# Patient Record
Sex: FEMALE | Race: WHITE | Employment: FULL TIME | URBAN - METROPOLITAN AREA
[De-identification: names, ages, dates, MRNs, and addresses within clinical notes are randomized per-mention and may not be internally consistent; named-entity substitution may affect disease eponyms.]

---

## 2017-01-30 RX ORDER — CYCLOBENZAPRINE HCL 10 MG
10 TABLET ORAL EVERY 8 HOURS PRN
Qty: 30 TABLET | Refills: 0 | Status: SHIPPED | OUTPATIENT
Start: 2017-01-30 | End: 2017-09-11 | Stop reason: SDUPTHER

## 2017-02-13 RX ORDER — ATORVASTATIN CALCIUM 20 MG/1
TABLET, FILM COATED ORAL
Qty: 30 TABLET | Refills: 3 | Status: SHIPPED | OUTPATIENT
Start: 2017-02-13 | End: 2017-06-26 | Stop reason: SDUPTHER

## 2017-02-24 RX ORDER — BUTALBITAL, ACETAMINOPHEN AND CAFFEINE 50; 325; 40 MG/1; MG/1; MG/1
TABLET ORAL
Qty: 90 TABLET | Refills: 0 | Status: SHIPPED | OUTPATIENT
Start: 2017-02-24 | End: 2017-05-06 | Stop reason: SDUPTHER

## 2017-03-09 RX ORDER — ZAFIRLUKAST 20 MG/1
TABLET, FILM COATED ORAL
Qty: 60 TABLET | Refills: 5 | Status: SHIPPED | OUTPATIENT
Start: 2017-03-09 | End: 2018-01-15 | Stop reason: SDUPTHER

## 2017-03-14 ENCOUNTER — OFFICE VISIT (OUTPATIENT)
Dept: FAMILY MEDICINE CLINIC | Age: 42
End: 2017-03-14

## 2017-03-14 ENCOUNTER — TELEPHONE (OUTPATIENT)
Dept: FAMILY MEDICINE CLINIC | Age: 42
End: 2017-03-14

## 2017-03-14 VITALS
WEIGHT: 204.4 LBS | DIASTOLIC BLOOD PRESSURE: 68 MMHG | SYSTOLIC BLOOD PRESSURE: 118 MMHG | HEART RATE: 92 BPM | RESPIRATION RATE: 20 BRPM | TEMPERATURE: 98.9 F | BODY MASS INDEX: 32.99 KG/M2

## 2017-03-14 DIAGNOSIS — J40 BRONCHITIS: Primary | ICD-10-CM

## 2017-03-14 PROCEDURE — 99213 OFFICE O/P EST LOW 20 MIN: CPT | Performed by: FAMILY MEDICINE

## 2017-03-14 RX ORDER — ALBUTEROL SULFATE 90 UG/1
2 AEROSOL, METERED RESPIRATORY (INHALATION) EVERY 6 HOURS PRN
Qty: 1 INHALER | Refills: 0 | OUTPATIENT
Start: 2017-03-14

## 2017-03-14 RX ORDER — CEFADROXIL 500 MG/1
500 CAPSULE ORAL 2 TIMES DAILY
Qty: 20 CAPSULE | Refills: 0 | Status: SHIPPED | OUTPATIENT
Start: 2017-03-14 | End: 2017-03-24

## 2017-03-14 RX ORDER — ALBUTEROL SULFATE 90 UG/1
2 AEROSOL, METERED RESPIRATORY (INHALATION) EVERY 6 HOURS PRN
Qty: 1 INHALER | Refills: 5 | Status: SHIPPED | OUTPATIENT
Start: 2017-03-14 | End: 2017-03-15 | Stop reason: CLARIF

## 2017-03-14 ASSESSMENT — ENCOUNTER SYMPTOMS
EYES NEGATIVE: 1
SORE THROAT: 1
SHORTNESS OF BREATH: 1
BACK PAIN: 0
SINUS PRESSURE: 1
COUGH: 1
VOMITING: 0
DIARRHEA: 0
ABDOMINAL PAIN: 0
NAUSEA: 0
RHINORRHEA: 0
CHEST TIGHTNESS: 0

## 2017-03-15 RX ORDER — ALBUTEROL SULFATE 90 UG/1
2 AEROSOL, METERED RESPIRATORY (INHALATION) EVERY 6 HOURS PRN
Qty: 1 INHALER | Refills: 5 | Status: SHIPPED | OUTPATIENT
Start: 2017-03-15 | End: 2017-03-21

## 2017-03-20 ENCOUNTER — TELEPHONE (OUTPATIENT)
Dept: FAMILY MEDICINE CLINIC | Age: 42
End: 2017-03-20

## 2017-03-20 RX ORDER — BENZONATATE 200 MG/1
200 CAPSULE ORAL 3 TIMES DAILY PRN
Qty: 30 CAPSULE | Refills: 0 | Status: SHIPPED | OUTPATIENT
Start: 2017-03-20 | End: 2017-03-27

## 2017-03-21 ENCOUNTER — TELEPHONE (OUTPATIENT)
Dept: FAMILY MEDICINE CLINIC | Age: 42
End: 2017-03-21

## 2017-03-28 ENCOUNTER — OFFICE VISIT (OUTPATIENT)
Dept: FAMILY MEDICINE CLINIC | Age: 42
End: 2017-03-28

## 2017-03-28 VITALS
WEIGHT: 205 LBS | BODY MASS INDEX: 33.09 KG/M2 | TEMPERATURE: 98.8 F | HEART RATE: 84 BPM | SYSTOLIC BLOOD PRESSURE: 110 MMHG | DIASTOLIC BLOOD PRESSURE: 84 MMHG | RESPIRATION RATE: 20 BRPM

## 2017-03-28 DIAGNOSIS — J01.10 SUBACUTE FRONTAL SINUSITIS: Primary | ICD-10-CM

## 2017-03-28 DIAGNOSIS — J01.40 SUBACUTE PANSINUSITIS: ICD-10-CM

## 2017-03-28 PROCEDURE — 96372 THER/PROPH/DIAG INJ SC/IM: CPT | Performed by: NURSE PRACTITIONER

## 2017-03-28 PROCEDURE — 99213 OFFICE O/P EST LOW 20 MIN: CPT | Performed by: NURSE PRACTITIONER

## 2017-03-28 RX ORDER — BUTALBITAL, ACETAMINOPHEN AND CAFFEINE 300; 40; 50 MG/1; MG/1; MG/1
1 CAPSULE ORAL EVERY 6 HOURS PRN
Qty: 60 CAPSULE | Refills: 0 | Status: SHIPPED | OUTPATIENT
Start: 2017-03-28 | End: 2017-07-10

## 2017-03-28 RX ORDER — METHYLPREDNISOLONE ACETATE 80 MG/ML
120 INJECTION, SUSPENSION INTRA-ARTICULAR; INTRALESIONAL; INTRAMUSCULAR; SOFT TISSUE ONCE
Status: COMPLETED | OUTPATIENT
Start: 2017-03-28 | End: 2017-03-28

## 2017-03-28 RX ORDER — PREDNISONE 1 MG/1
TABLET ORAL
Qty: 30 TABLET | Refills: 0 | Status: SHIPPED | OUTPATIENT
Start: 2017-03-28 | End: 2017-04-07

## 2017-03-28 RX ORDER — LORATADINE 10 MG/1
10 TABLET ORAL DAILY
Qty: 30 TABLET | Refills: 1 | Status: SHIPPED | OUTPATIENT
Start: 2017-03-28 | End: 2017-07-10

## 2017-03-28 RX ORDER — FLUTICASONE PROPIONATE 50 MCG
1 SPRAY, SUSPENSION (ML) NASAL DAILY
Qty: 1 BOTTLE | Refills: 3 | Status: SHIPPED | OUTPATIENT
Start: 2017-03-28 | End: 2017-07-10

## 2017-03-28 RX ORDER — CIPROFLOXACIN 500 MG/1
500 TABLET, FILM COATED ORAL 2 TIMES DAILY
Qty: 20 TABLET | Refills: 0 | Status: SHIPPED | OUTPATIENT
Start: 2017-03-28 | End: 2017-10-09 | Stop reason: SDUPTHER

## 2017-03-28 RX ADMIN — METHYLPREDNISOLONE ACETATE 120 MG: 80 INJECTION, SUSPENSION INTRA-ARTICULAR; INTRALESIONAL; INTRAMUSCULAR; SOFT TISSUE at 12:28

## 2017-04-03 ASSESSMENT — ENCOUNTER SYMPTOMS
FACIAL SWELLING: 1
SINUS PRESSURE: 1
EYES NEGATIVE: 1
GASTROINTESTINAL NEGATIVE: 1
RESPIRATORY NEGATIVE: 1

## 2017-05-08 RX ORDER — BUTALBITAL, ACETAMINOPHEN AND CAFFEINE 50; 325; 40 MG/1; MG/1; MG/1
TABLET ORAL
Qty: 90 TABLET | Refills: 0 | Status: SHIPPED | OUTPATIENT
Start: 2017-05-08 | End: 2017-07-10 | Stop reason: SDUPTHER

## 2017-06-26 RX ORDER — ATORVASTATIN CALCIUM 20 MG/1
20 TABLET, FILM COATED ORAL DAILY
Qty: 30 TABLET | Refills: 8 | Status: SHIPPED | OUTPATIENT
Start: 2017-06-26 | End: 2018-04-09 | Stop reason: SDUPTHER

## 2017-07-10 ENCOUNTER — OFFICE VISIT (OUTPATIENT)
Dept: FAMILY MEDICINE CLINIC | Age: 42
End: 2017-07-10

## 2017-07-10 VITALS
HEIGHT: 66 IN | WEIGHT: 212 LBS | HEART RATE: 96 BPM | DIASTOLIC BLOOD PRESSURE: 68 MMHG | SYSTOLIC BLOOD PRESSURE: 116 MMHG | TEMPERATURE: 98.8 F | BODY MASS INDEX: 34.07 KG/M2 | RESPIRATION RATE: 16 BRPM

## 2017-07-10 DIAGNOSIS — E78.5 HYPERLIPIDEMIA, UNSPECIFIED HYPERLIPIDEMIA TYPE: ICD-10-CM

## 2017-07-10 DIAGNOSIS — Z00.00 ROUTINE GENERAL MEDICAL EXAMINATION AT A HEALTH CARE FACILITY: Primary | ICD-10-CM

## 2017-07-10 PROCEDURE — 99396 PREV VISIT EST AGE 40-64: CPT | Performed by: FAMILY MEDICINE

## 2017-07-10 RX ORDER — BUTALBITAL, ACETAMINOPHEN AND CAFFEINE 50; 325; 40 MG/1; MG/1; MG/1
TABLET ORAL
Qty: 90 TABLET | Refills: 1 | Status: SHIPPED | OUTPATIENT
Start: 2017-07-10 | End: 2017-10-20 | Stop reason: SDUPTHER

## 2017-07-10 ASSESSMENT — PATIENT HEALTH QUESTIONNAIRE - PHQ9
SUM OF ALL RESPONSES TO PHQ9 QUESTIONS 1 & 2: 0
SUM OF ALL RESPONSES TO PHQ QUESTIONS 1-9: 0
2. FEELING DOWN, DEPRESSED OR HOPELESS: 0
1. LITTLE INTEREST OR PLEASURE IN DOING THINGS: 0

## 2017-07-12 ENCOUNTER — PATIENT MESSAGE (OUTPATIENT)
Dept: FAMILY MEDICINE CLINIC | Age: 42
End: 2017-07-12

## 2017-07-16 ASSESSMENT — ENCOUNTER SYMPTOMS
ABDOMINAL PAIN: 0
CONSTIPATION: 0
VOMITING: 0
NAUSEA: 0
RHINORRHEA: 0
BACK PAIN: 0
SORE THROAT: 0
DIARRHEA: 0

## 2017-07-18 RX ORDER — VENLAFAXINE HYDROCHLORIDE 75 MG/1
225 CAPSULE, EXTENDED RELEASE ORAL DAILY
Qty: 270 CAPSULE | Refills: 3 | Status: SHIPPED | OUTPATIENT
Start: 2017-07-18 | End: 2018-07-12 | Stop reason: SDUPTHER

## 2017-07-19 ENCOUNTER — TELEPHONE (OUTPATIENT)
Dept: FAMILY MEDICINE CLINIC | Age: 42
End: 2017-07-19

## 2017-08-08 ENCOUNTER — PATIENT MESSAGE (OUTPATIENT)
Dept: FAMILY MEDICINE CLINIC | Age: 42
End: 2017-08-08

## 2017-08-29 ENCOUNTER — OFFICE VISIT (OUTPATIENT)
Dept: FAMILY MEDICINE CLINIC | Age: 42
End: 2017-08-29
Payer: COMMERCIAL

## 2017-08-29 VITALS
TEMPERATURE: 98.3 F | BODY MASS INDEX: 34.01 KG/M2 | RESPIRATION RATE: 20 BRPM | HEART RATE: 84 BPM | SYSTOLIC BLOOD PRESSURE: 116 MMHG | WEIGHT: 211.6 LBS | DIASTOLIC BLOOD PRESSURE: 76 MMHG

## 2017-08-29 DIAGNOSIS — F43.23 ADJUSTMENT DISORDER WITH MIXED ANXIETY AND DEPRESSED MOOD: ICD-10-CM

## 2017-08-29 DIAGNOSIS — M77.12 LATERAL EPICONDYLITIS OF LEFT ELBOW: Primary | ICD-10-CM

## 2017-08-29 PROCEDURE — 96372 THER/PROPH/DIAG INJ SC/IM: CPT | Performed by: FAMILY MEDICINE

## 2017-08-29 PROCEDURE — 99213 OFFICE O/P EST LOW 20 MIN: CPT | Performed by: FAMILY MEDICINE

## 2017-08-29 RX ORDER — METHYLPREDNISOLONE ACETATE 80 MG/ML
160 INJECTION, SUSPENSION INTRA-ARTICULAR; INTRALESIONAL; INTRAMUSCULAR; SOFT TISSUE ONCE
Status: COMPLETED | OUTPATIENT
Start: 2017-08-29 | End: 2017-08-29

## 2017-08-29 RX ADMIN — METHYLPREDNISOLONE ACETATE 160 MG: 80 INJECTION, SUSPENSION INTRA-ARTICULAR; INTRALESIONAL; INTRAMUSCULAR; SOFT TISSUE at 09:17

## 2017-08-30 ASSESSMENT — ENCOUNTER SYMPTOMS
NAUSEA: 0
SORE THROAT: 0
DIARRHEA: 0
RHINORRHEA: 0
CHEST TIGHTNESS: 0
BACK PAIN: 0
SHORTNESS OF BREATH: 0
VOMITING: 0
ABDOMINAL PAIN: 0
COUGH: 0
EYES NEGATIVE: 1

## 2017-09-11 RX ORDER — CYCLOBENZAPRINE HCL 10 MG
TABLET ORAL
Qty: 30 TABLET | Refills: 0 | Status: SHIPPED | OUTPATIENT
Start: 2017-09-11 | End: 2018-02-13 | Stop reason: SDUPTHER

## 2017-10-06 ENCOUNTER — TELEPHONE (OUTPATIENT)
Dept: FAMILY MEDICINE CLINIC | Age: 42
End: 2017-10-06

## 2017-10-06 NOTE — TELEPHONE ENCOUNTER
Called and spoke with the pt. She states that she has a few pills left of her cipro and flagyl that's been called in prior for this issue. Pt notified that MM is not in the office and that this will be addressed Monday. Pt advised to go to UC if needed. Pt states she is doing well right now and will go if needed. She is requesting refills of her medication for her diverticulitis.

## 2017-10-09 RX ORDER — CIPROFLOXACIN 500 MG/1
500 TABLET, FILM COATED ORAL 2 TIMES DAILY
Qty: 20 TABLET | Refills: 0 | Status: SHIPPED | OUTPATIENT
Start: 2017-10-09 | End: 2018-03-08 | Stop reason: SDUPTHER

## 2017-10-09 RX ORDER — METRONIDAZOLE 500 MG/1
500 TABLET ORAL 3 TIMES DAILY
Qty: 30 TABLET | Refills: 0 | Status: SHIPPED | OUTPATIENT
Start: 2017-10-09 | End: 2018-03-08 | Stop reason: SDUPTHER

## 2017-10-09 NOTE — TELEPHONE ENCOUNTER
Pt called today to see if Dr. Betina Ewing would call in the antibiotic for her diverticulitis.  Please advise

## 2017-10-20 RX ORDER — BUTALBITAL, ACETAMINOPHEN AND CAFFEINE 50; 325; 40 MG/1; MG/1; MG/1
TABLET ORAL
Qty: 90 TABLET | Refills: 1 | Status: SHIPPED | OUTPATIENT
Start: 2017-10-20 | End: 2018-01-12 | Stop reason: SDUPTHER

## 2018-01-12 RX ORDER — BUTALBITAL, ACETAMINOPHEN AND CAFFEINE 50; 325; 40 MG/1; MG/1; MG/1
TABLET ORAL
Qty: 90 TABLET | Refills: 1 | Status: SHIPPED | OUTPATIENT
Start: 2018-01-12 | End: 2018-03-30

## 2018-01-15 RX ORDER — ZAFIRLUKAST 20 MG/1
TABLET, FILM COATED ORAL
Qty: 60 TABLET | Refills: 11 | Status: SHIPPED | OUTPATIENT
Start: 2018-01-15 | End: 2019-03-25 | Stop reason: SDUPTHER

## 2018-02-13 RX ORDER — CYCLOBENZAPRINE HCL 10 MG
10 TABLET ORAL EVERY 8 HOURS PRN
Qty: 30 TABLET | Refills: 0 | Status: SHIPPED | OUTPATIENT
Start: 2018-02-13 | End: 2018-07-30 | Stop reason: SDUPTHER

## 2018-02-13 NOTE — TELEPHONE ENCOUNTER
Flexeril 10mg last written:09/11/2017 30 tablets with 0 refills  Last seen:08/29/2017, No future appointments  Rx verified,ordered,and set to escribe

## 2018-03-08 ENCOUNTER — TELEPHONE (OUTPATIENT)
Dept: FAMILY MEDICINE CLINIC | Age: 43
End: 2018-03-08

## 2018-03-08 RX ORDER — CIPROFLOXACIN 500 MG/1
500 TABLET, FILM COATED ORAL 2 TIMES DAILY
Qty: 20 TABLET | Refills: 0 | Status: SHIPPED | OUTPATIENT
Start: 2018-03-08 | End: 2018-03-18

## 2018-03-08 RX ORDER — METRONIDAZOLE 500 MG/1
500 TABLET ORAL 3 TIMES DAILY
Qty: 30 TABLET | Refills: 0 | Status: SHIPPED | OUTPATIENT
Start: 2018-03-08 | End: 2018-03-18

## 2018-03-21 ENCOUNTER — TELEPHONE (OUTPATIENT)
Dept: FAMILY MEDICINE CLINIC | Age: 43
End: 2018-03-21

## 2018-03-21 RX ORDER — AZITHROMYCIN 250 MG/1
TABLET, FILM COATED ORAL
Qty: 6 TABLET | Refills: 0 | Status: SHIPPED | OUTPATIENT
Start: 2018-03-21 | End: 2018-06-07

## 2018-03-21 RX ORDER — METHYLPREDNISOLONE 4 MG/1
TABLET ORAL
Qty: 1 KIT | Refills: 0 | Status: SHIPPED | OUTPATIENT
Start: 2018-03-21 | End: 2018-03-27

## 2018-03-29 ENCOUNTER — TELEPHONE (OUTPATIENT)
Dept: FAMILY MEDICINE CLINIC | Age: 43
End: 2018-03-29

## 2018-03-30 ENCOUNTER — HOSPITAL ENCOUNTER (EMERGENCY)
Age: 43
Discharge: HOME | End: 2018-03-30
Payer: COMMERCIAL

## 2018-03-30 VITALS
WEIGHT: 208 LBS | SYSTOLIC BLOOD PRESSURE: 124 MMHG | HEART RATE: 96 BPM | BODY MASS INDEX: 33.43 KG/M2 | RESPIRATION RATE: 16 BRPM | DIASTOLIC BLOOD PRESSURE: 86 MMHG | HEIGHT: 66 IN | TEMPERATURE: 99 F | OXYGEN SATURATION: 98 %

## 2018-03-30 DIAGNOSIS — J01.00 ACUTE NON-RECURRENT MAXILLARY SINUSITIS: Primary | ICD-10-CM

## 2018-03-30 DIAGNOSIS — H66.002 ACUTE SUPPURATIVE OTITIS MEDIA OF LEFT EAR WITHOUT SPONTANEOUS RUPTURE OF TYMPANIC MEMBRANE, RECURRENCE NOT SPECIFIED: ICD-10-CM

## 2018-03-30 PROCEDURE — 99213 OFFICE O/P EST LOW 20 MIN: CPT | Performed by: NURSE PRACTITIONER

## 2018-03-30 PROCEDURE — 99214 OFFICE O/P EST MOD 30 MIN: CPT

## 2018-03-30 RX ORDER — LEVOFLOXACIN 500 MG/1
500 TABLET, FILM COATED ORAL DAILY
Qty: 10 TABLET | Refills: 0 | Status: SHIPPED | OUTPATIENT
Start: 2018-03-30 | End: 2018-04-09

## 2018-03-30 ASSESSMENT — PAIN DESCRIPTION - ORIENTATION: ORIENTATION: RIGHT

## 2018-03-30 ASSESSMENT — PAIN DESCRIPTION - DESCRIPTORS: DESCRIPTORS: CONSTANT;PRESSURE

## 2018-03-30 ASSESSMENT — ENCOUNTER SYMPTOMS
SORE THROAT: 0
COUGH: 1

## 2018-03-30 ASSESSMENT — PAIN SCALES - GENERAL: PAINLEVEL_OUTOF10: 8

## 2018-03-30 ASSESSMENT — PAIN DESCRIPTION - LOCATION: LOCATION: HEAD

## 2018-03-30 ASSESSMENT — PAIN DESCRIPTION - PAIN TYPE: TYPE: ACUTE PAIN

## 2018-04-09 RX ORDER — ATORVASTATIN CALCIUM 20 MG/1
20 TABLET, FILM COATED ORAL DAILY
Qty: 30 TABLET | Refills: 5 | Status: SHIPPED | OUTPATIENT
Start: 2018-04-09 | End: 2018-08-29 | Stop reason: SDUPTHER

## 2018-04-23 RX ORDER — BUTALBITAL, ACETAMINOPHEN AND CAFFEINE 50; 325; 40 MG/1; MG/1; MG/1
TABLET ORAL
Qty: 90 TABLET | Refills: 0 | Status: SHIPPED | OUTPATIENT
Start: 2018-04-23 | End: 2018-06-07 | Stop reason: SDUPTHER

## 2018-06-05 RX ORDER — BUTALBITAL, ACETAMINOPHEN AND CAFFEINE 50; 325; 40 MG/1; MG/1; MG/1
TABLET ORAL
Qty: 90 TABLET | Refills: 0 | OUTPATIENT
Start: 2018-06-05

## 2018-06-07 ENCOUNTER — OFFICE VISIT (OUTPATIENT)
Dept: FAMILY MEDICINE CLINIC | Age: 43
End: 2018-06-07
Payer: COMMERCIAL

## 2018-06-07 VITALS
WEIGHT: 219.6 LBS | DIASTOLIC BLOOD PRESSURE: 72 MMHG | HEIGHT: 66 IN | RESPIRATION RATE: 20 BRPM | BODY MASS INDEX: 35.29 KG/M2 | TEMPERATURE: 98.8 F | HEART RATE: 104 BPM | SYSTOLIC BLOOD PRESSURE: 110 MMHG

## 2018-06-07 DIAGNOSIS — G44.209 TENSION HEADACHE: Primary | ICD-10-CM

## 2018-06-07 PROCEDURE — 99213 OFFICE O/P EST LOW 20 MIN: CPT | Performed by: FAMILY MEDICINE

## 2018-06-07 RX ORDER — BUTALBITAL, ACETAMINOPHEN AND CAFFEINE 50; 325; 40 MG/1; MG/1; MG/1
TABLET ORAL
Qty: 90 TABLET | Refills: 2 | Status: SHIPPED | OUTPATIENT
Start: 2018-06-07 | End: 2018-12-04 | Stop reason: SDUPTHER

## 2018-06-10 ASSESSMENT — ENCOUNTER SYMPTOMS
ABDOMINAL PAIN: 0
VOMITING: 0
BACK PAIN: 0
DIARRHEA: 0
NAUSEA: 0
SORE THROAT: 0
COUGH: 0
RHINORRHEA: 0
EYES NEGATIVE: 1
SHORTNESS OF BREATH: 0
CHEST TIGHTNESS: 0

## 2018-07-12 RX ORDER — VENLAFAXINE HYDROCHLORIDE 75 MG/1
225 CAPSULE, EXTENDED RELEASE ORAL DAILY
Qty: 270 CAPSULE | Refills: 3 | Status: SHIPPED | OUTPATIENT
Start: 2018-07-12 | End: 2018-12-11 | Stop reason: ALTCHOICE

## 2018-07-12 NOTE — TELEPHONE ENCOUNTER
Rx request from [facility] for Effexor XR 75mg 3 capsules QD  Last written:07/18/2017 270 capsules with 3 refills  Last seen:06/07/2018, No future appointments  Rx verified,ordered,and set to escribe

## 2018-07-30 RX ORDER — CYCLOBENZAPRINE HCL 10 MG
10 TABLET ORAL EVERY 8 HOURS PRN
Qty: 30 TABLET | Refills: 0 | Status: SHIPPED | OUTPATIENT
Start: 2018-07-30 | End: 2018-12-19 | Stop reason: SDUPTHER

## 2018-07-30 NOTE — TELEPHONE ENCOUNTER
Flexeril 10mg last written:02/13/2018 30 tablets with 0 refills  Last seen:06/07/2018, No future appointments  Rx verified,ordered,and set to escribe

## 2018-08-21 ENCOUNTER — OFFICE VISIT (OUTPATIENT)
Dept: FAMILY MEDICINE CLINIC | Age: 43
End: 2018-08-21
Payer: COMMERCIAL

## 2018-08-21 VITALS
BODY MASS INDEX: 34.94 KG/M2 | HEART RATE: 68 BPM | HEIGHT: 67 IN | DIASTOLIC BLOOD PRESSURE: 68 MMHG | TEMPERATURE: 99.3 F | SYSTOLIC BLOOD PRESSURE: 105 MMHG | RESPIRATION RATE: 16 BRPM | WEIGHT: 222.6 LBS

## 2018-08-21 DIAGNOSIS — Z00.00 ROUTINE GENERAL MEDICAL EXAMINATION AT A HEALTH CARE FACILITY: Primary | ICD-10-CM

## 2018-08-21 PROCEDURE — 99396 PREV VISIT EST AGE 40-64: CPT | Performed by: FAMILY MEDICINE

## 2018-08-21 ASSESSMENT — PATIENT HEALTH QUESTIONNAIRE - PHQ9
SUM OF ALL RESPONSES TO PHQ QUESTIONS 1-9: 0
2. FEELING DOWN, DEPRESSED OR HOPELESS: 0
1. LITTLE INTEREST OR PLEASURE IN DOING THINGS: 0
SUM OF ALL RESPONSES TO PHQ9 QUESTIONS 1 & 2: 0
SUM OF ALL RESPONSES TO PHQ QUESTIONS 1-9: 0

## 2018-08-21 ASSESSMENT — ENCOUNTER SYMPTOMS
SORE THROAT: 0
ABDOMINAL PAIN: 0
BACK PAIN: 0
COUGH: 0
CHEST TIGHTNESS: 0
VOMITING: 0
DIARRHEA: 0
NAUSEA: 0
EYES NEGATIVE: 1
SHORTNESS OF BREATH: 0
RHINORRHEA: 0

## 2018-08-21 NOTE — PROGRESS NOTES
[address] [address]  Dept: [phone]  Dept Fax: [phone]  Loc: [phone]  PROGRESS NOTE      Visit Date: 8/21/2018    Navjot Mota is a 41 y.o. female who presents today for:  Chief Complaint   Patient presents with    Headache     top of head hit by falling xbox last night, painful to touch, HA    Annual Exam     form to sign    Other     interested in weight loss    Pharyngitis     last night throat started feeling scratchy         Subjective:  HPI  Here for wellness. No issues. Had a video game: Her head last night when I have that checked and would like to try to lose some weight. Trying dieting without much success. Her dieting consists of trying to eat right and cutting back on carbs but has no specific targets for numbers of calories were amount of carbohydrates in the day    Review of Systems   Constitutional: Negative for activity change, appetite change, fatigue and fever. HENT: Negative for congestion, rhinorrhea and sore throat. Eyes: Negative. Respiratory: Negative for cough, chest tightness and shortness of breath. Cardiovascular: Negative for chest pain and palpitations. Gastrointestinal: Negative for abdominal pain, diarrhea, nausea and vomiting. Genitourinary: Negative for dysuria and urgency. Musculoskeletal: Negative for arthralgias and back pain. Neurological: Negative for dizziness and headaches. Psychiatric/Behavioral: Negative for dysphoric mood. The patient is not nervous/anxious.       Past Medical History:   Diagnosis Date    Anxiety     Arthritis     Asthma     Depression     Diverticulitis     Hiatal hernia     Hyperlipidemia     Obesity       Past Surgical History:   Procedure Laterality Date    CHOLECYSTECTOMY, LAPAROSCOPIC  3/4/13    Dr. Ben Quintero    right    [facility]    exploratory-Dr. Zeina Travis URETER STENT PLACEMENT  2004    Dr. Jose Kearney     Family History   Problem Relation Age of Onset    Heart Disease Father     COPD Father     Diabetes Sister     COPD Paternal Grandfather     Emphysema Paternal Grandfather     Asthma Paternal Grandfather     Diabetes Mother     High Cholesterol Mother     High Blood Pressure Maternal Grandmother     Glaucoma Maternal Grandmother     Stroke Maternal Grandfather     High Cholesterol Paternal Grandmother     Other Paternal Grandmother         begnin brain tumor     Social History   Substance Use Topics    Smoking status: Never Smoker    Smokeless tobacco: Never Used    Alcohol use No      Current Outpatient Prescriptions   Medication Sig Dispense Refill    cyclobenzaprine (FLEXERIL) 10 MG tablet Take 1 tablet by mouth every 8 hours as needed for Muscle spasms 30 tablet 0    venlafaxine (EFFEXOR XR) 75 MG extended release capsule TAKE 3 CAPSULES BY MOUTH DAILY 270 capsule 3    butalbital-acetaminophen-caffeine (FIORICET, ESGIC) -40 MG per tablet TAKE 1 TO 2 TABLETS BY MOUTH EVERY 6 HOURS AS NEEDED FOR HEADACHE 90 tablet 2    atorvastatin (LIPITOR) 20 MG tablet Take 1 tablet by mouth daily 30 tablet 5    zafirlukast (ACCOLATE) 20 MG tablet TAKE 1 TABLET BY MOUTH TWICE DAILY 60 tablet 11    dicyclomine (BENTYL) 20 MG tablet TAKE 1 TABLET BY MOUTH THREE TIMES DAILY AS NEEDED FOR ABDOMINAL CRAMPS 60 tablet 0    sucralfate (CARAFATE) 1 GM tablet Take 1 g by mouth 2 times daily   11    dexlansoprazole (DEXILANT) 60 MG CPDR capsule Take 60 mg by mouth daily      albuterol (PROVENTIL) (5 MG/ML) 0.5% nebulizer solution Take 0.5 mLs by nebulization every 6 hours as needed for Wheezing 30 vial 0     No current facility-administered medications for this visit.       No Known Allergies  Health Maintenance   Topic Date Due    HIV screen  04/20/1992    Cervical cancer screen  12/01/2017    Flu vaccine (1) 09/01/2018    Lipid screen  05/27/2021    DTaP/Tdap/Td vaccine (2 - Td) 10/31/2024    Pneumococcal med risk  Completed         Objective:     Physical Exam   Constitutional: She is oriented to person, place, and time. She appears well-developed and well-nourished. No distress. HENT:   Head: Normocephalic and atraumatic. Eyes: Conjunctivae are normal. No scleral icterus. Neck: No JVD present. No thyromegaly present. No bruits   Cardiovascular: Normal rate, regular rhythm and normal heart sounds. Pulmonary/Chest: Effort normal and breath sounds normal. No respiratory distress. She has no wheezes. She has no rales. Abdominal: Soft. She exhibits no mass. There is no tenderness. There is no guarding. Musculoskeletal: She exhibits no edema or tenderness. Neurological: She is alert and oriented to person, place, and time. No cranial nerve deficit. Skin: Skin is warm and dry. No rash noted. She is not diaphoretic. /68 (Site: Left Arm)   Pulse 68   Temp 99.3 °F (37.4 °C) (Oral)   Resp 16   Ht 5' 7.25\" (1.708 m)   Wt 222 lb 9.6 oz (101 kg)   LMP 08/20/2018 (Exact Date)   BMI 34.61 kg/m²       Impression/Plan:  1. Routine general medical examination at a health care facility      Requested Prescriptions      No prescriptions requested or ordered in this encounter     Orders Placed This Encounter   Procedures    Lipid Panel     Standing Status:   Future     Standing Expiration Date:   8/21/2019     Order Specific Question:   Is Patient Fasting?/# of Hours     Answer:   yes/12    Comprehensive Metabolic Panel     Standing Status:   Future     Standing Expiration Date:   8/21/2019    CBC Auto Differential     Standing Status:   Future     Standing Expiration Date:   8/21/2019   Counseled on weight loss carb counting and calorie counting for weight loss. Seen today for wellness visit. Discussed the importance of a healthy life style. Balanced diet, nutrition, physical activity,and injury prevention.   Also discussed the importance of up to date immunizations and annual screenings. Patient given educational materials - see patient instructions. Discussed use, benefit, and side effects of prescribed medications. All patient questions answered. Pt voiced understanding. Reviewed health maintenance. Patient agreed with treatment plan. Follow up as directed. **This report has been created using voice recognition software. It may contain minor errors which are inherent in voice recognition technology. **       Electronically signed by Patito Owens MD on 8/21/2018 at 6:33 PM

## 2018-08-29 RX ORDER — ATORVASTATIN CALCIUM 20 MG/1
TABLET, FILM COATED ORAL
Qty: 90 TABLET | Refills: 2 | Status: SHIPPED | OUTPATIENT
Start: 2018-08-29 | End: 2019-06-01 | Stop reason: SDUPTHER

## 2018-10-04 ENCOUNTER — OFFICE VISIT (OUTPATIENT)
Dept: FAMILY MEDICINE CLINIC | Age: 43
End: 2018-10-04
Payer: COMMERCIAL

## 2018-10-04 VITALS
HEIGHT: 67 IN | WEIGHT: 223.6 LBS | HEART RATE: 96 BPM | RESPIRATION RATE: 16 BRPM | DIASTOLIC BLOOD PRESSURE: 78 MMHG | BODY MASS INDEX: 35.09 KG/M2 | SYSTOLIC BLOOD PRESSURE: 122 MMHG | TEMPERATURE: 99.6 F

## 2018-10-04 DIAGNOSIS — K12.2 UVULITIS: Primary | ICD-10-CM

## 2018-10-04 DIAGNOSIS — J02.9 ACUTE PHARYNGITIS, UNSPECIFIED ETIOLOGY: ICD-10-CM

## 2018-10-04 DIAGNOSIS — Z12.39 SCREENING FOR BREAST CANCER: ICD-10-CM

## 2018-10-04 PROCEDURE — 99213 OFFICE O/P EST LOW 20 MIN: CPT | Performed by: NURSE PRACTITIONER

## 2018-10-04 RX ORDER — AMOXICILLIN AND CLAVULANATE POTASSIUM 500; 125 MG/1; MG/1
1 TABLET, FILM COATED ORAL 3 TIMES DAILY
Qty: 30 TABLET | Refills: 0 | Status: SHIPPED | OUTPATIENT
Start: 2018-10-04 | End: 2018-10-14

## 2018-10-04 RX ORDER — LORATADINE 10 MG/1
10 TABLET ORAL DAILY
Qty: 30 TABLET | Refills: 0 | Status: SHIPPED | OUTPATIENT
Start: 2018-10-04 | End: 2019-02-04

## 2018-10-04 RX ORDER — PREDNISONE 1 MG/1
TABLET ORAL
Qty: 30 TABLET | Refills: 0 | Status: SHIPPED | OUTPATIENT
Start: 2018-10-04 | End: 2018-10-14

## 2018-10-15 ASSESSMENT — ENCOUNTER SYMPTOMS
SORE THROAT: 1
TROUBLE SWALLOWING: 1

## 2018-11-20 RX ORDER — ESCITALOPRAM OXALATE 10 MG/1
10 TABLET ORAL DAILY
Qty: 30 TABLET | Refills: 3 | Status: SHIPPED | OUTPATIENT
Start: 2018-11-20 | End: 2018-12-11 | Stop reason: ALTCHOICE

## 2018-12-04 RX ORDER — BUTALBITAL, ACETAMINOPHEN AND CAFFEINE 50; 325; 40 MG/1; MG/1; MG/1
TABLET ORAL
Qty: 90 TABLET | Refills: 2 | Status: SHIPPED | OUTPATIENT
Start: 2018-12-04 | End: 2019-05-02 | Stop reason: SDUPTHER

## 2018-12-04 NOTE — TELEPHONE ENCOUNTER
From: Merritt Sommers  Sent: 12/4/2018 2:16 PM EST  Subject: Medication Renewal Request    Merritt Sommers would like a refill of the following medications:     butalbital-acetaminophen-caffeine (FIORICET, ESGIC) -40 MG per tablet Julien Haywood MD]    Preferred pharmacy: [facility] - [address] [place] [address]

## 2018-12-11 ENCOUNTER — OFFICE VISIT (OUTPATIENT)
Dept: FAMILY MEDICINE CLINIC | Age: 43
End: 2018-12-11
Payer: COMMERCIAL

## 2018-12-11 VITALS
BODY MASS INDEX: 35.41 KG/M2 | HEART RATE: 96 BPM | RESPIRATION RATE: 18 BRPM | TEMPERATURE: 98.9 F | WEIGHT: 227.8 LBS | DIASTOLIC BLOOD PRESSURE: 62 MMHG | OXYGEN SATURATION: 99 % | SYSTOLIC BLOOD PRESSURE: 118 MMHG

## 2018-12-11 DIAGNOSIS — F32.A DEPRESSION, UNSPECIFIED DEPRESSION TYPE: ICD-10-CM

## 2018-12-11 DIAGNOSIS — J06.9 VIRAL URI WITH COUGH: Primary | ICD-10-CM

## 2018-12-11 PROCEDURE — 99213 OFFICE O/P EST LOW 20 MIN: CPT | Performed by: NURSE PRACTITIONER

## 2018-12-11 RX ORDER — ESCITALOPRAM OXALATE 20 MG/1
20 TABLET ORAL DAILY
Qty: 30 TABLET | Refills: 3 | Status: SHIPPED | OUTPATIENT
Start: 2018-12-11 | End: 2019-04-08 | Stop reason: SDUPTHER

## 2018-12-11 RX ORDER — ALBUTEROL SULFATE 90 UG/1
2 AEROSOL, METERED RESPIRATORY (INHALATION) EVERY 6 HOURS PRN
Qty: 1 INHALER | Refills: 5 | Status: SHIPPED | OUTPATIENT
Start: 2018-12-11 | End: 2019-12-12 | Stop reason: SDUPTHER

## 2018-12-11 RX ORDER — PREDNISONE 20 MG/1
20 TABLET ORAL 2 TIMES DAILY
Qty: 10 TABLET | Refills: 0 | Status: SHIPPED | OUTPATIENT
Start: 2018-12-11 | End: 2018-12-16

## 2018-12-11 ASSESSMENT — ENCOUNTER SYMPTOMS
DIARRHEA: 0
COUGH: 1
ABDOMINAL PAIN: 0
CONSTIPATION: 0
WHEEZING: 1
SORE THROAT: 0
NAUSEA: 0
SHORTNESS OF BREATH: 1
DIFFICULTY BREATHING: 1

## 2018-12-11 NOTE — PROGRESS NOTES
[address] [address]  Dept: [phone]  Dept Fax: [phone]: [phone]     Visit Date:  12/11/2018      Patient:  Janet Correa  YOB: 1975    HPI:     Chief Complaint   Patient presents with    Asthma     pain in her sterum, when she takes a breath pain in chest and back    Breathing Problem     huffs when she breathes, breaths feel cold    Other     weening off effexor       Complains of 2-3 days of worsening sensation of tightness in her chest, cough, congestion, sternal pain with coughing. She denies fever, headache, sore throat or otalgia. She has been using albuterol treatments at home because her albuterol inhaler is empty. She states this seems to help. Has not used any over-the-counter medications. Does not smoke. Patient also discussed that she has been tapering her Cymbalta down over the past month so that she can increase her Lexapro from 10 mg to 20 mg. She states her symptoms of agitation are worse in the last week as she has lowered her dose of Cymbalta. Asthma   She complains of cough, difficulty breathing, shortness of breath and wheezing. Pertinent negatives include no appetite change, chest pain, fever, headaches, myalgias or sore throat. Her past medical history is significant for asthma. Breathing Problem   She complains of cough, difficulty breathing, shortness of breath and wheezing. Pertinent negatives include no appetite change, chest pain, fever, headaches, myalgias or sore throat. Her past medical history is significant for asthma. Other   Associated symptoms include congestion and coughing. Pertinent negatives include no abdominal pain, arthralgias, chest pain, diaphoresis, fatigue, fever, headaches, myalgias, nausea, rash, sore throat or weakness.        Medications    Current Outpatient Prescriptions:     albuterol (PROVENTIL) (5 MG/ML) 0.5% nebulizer solution, Take 0.5 mLs by nebulization every 6 hours as needed for Wheezing, Disp: 30 vial, Rfl: 0    predniSONE (DELTASONE) 20 MG tablet, Take 1 tablet by mouth 2 times daily for 5 days, Disp: 10 tablet, Rfl: 0    escitalopram (LEXAPRO) 20 MG tablet, Take 1 tablet by mouth daily, Disp: 30 tablet, Rfl: 3    butalbital-acetaminophen-caffeine (FIORICET, ESGIC) -40 MG per tablet, TAKE 1 TO 2 TABLETS BY MOUTH EVERY 6 HOURS AS NEEDED FOR HEADACHE, Disp: 90 tablet, Rfl: 2    loratadine (CLARITIN) 10 MG tablet, Take 1 tablet by mouth daily, Disp: 30 tablet, Rfl: 0    atorvastatin (LIPITOR) 20 MG tablet, TAKE 1 TABLET BY MOUTH ONE TIME A DAY , Disp: 90 tablet, Rfl: 2    cyclobenzaprine (FLEXERIL) 10 MG tablet, Take 1 tablet by mouth every 8 hours as needed for Muscle spasms, Disp: 30 tablet, Rfl: 0    zafirlukast (ACCOLATE) 20 MG tablet, TAKE 1 TABLET BY MOUTH TWICE DAILY, Disp: 60 tablet, Rfl: 11    dicyclomine (BENTYL) 20 MG tablet, TAKE 1 TABLET BY MOUTH THREE TIMES DAILY AS NEEDED FOR ABDOMINAL CRAMPS, Disp: 60 tablet, Rfl: 0    sucralfate (CARAFATE) 1 GM tablet, Take 1 g by mouth 2 times daily , Disp: , Rfl: 11    dexlansoprazole (DEXILANT) 60 MG CPDR capsule, Take 60 mg by mouth daily, Disp: , Rfl:     The patient has No Known Allergies. Past Medical History  Vaz Comes  has a past medical history of Anxiety; Arthritis; Asthma; Depression; Diverticulitis; Hiatal hernia; Hyperlipidemia; and Obesity. Subjective:      Review of Systems   Constitutional: Negative for appetite change, diaphoresis, fatigue and fever. HENT: Positive for congestion. Negative for sore throat and tinnitus. Eyes: Negative for visual disturbance. Respiratory: Positive for cough, shortness of breath and wheezing. Cardiovascular: Negative for chest pain and leg swelling. Gastrointestinal: Negative for abdominal pain, constipation, diarrhea and nausea. Genitourinary: Negative for frequency.

## 2018-12-19 RX ORDER — CYCLOBENZAPRINE HCL 10 MG
10 TABLET ORAL EVERY 8 HOURS PRN
Qty: 30 TABLET | Refills: 1 | Status: SHIPPED | OUTPATIENT
Start: 2018-12-19 | End: 2020-04-25 | Stop reason: SDUPTHER

## 2018-12-19 NOTE — TELEPHONE ENCOUNTER
From: Fermin Palmre  Sent: 12/18/2018 6:16 PM EST  Subject: Medication Renewal Request    Fermin Palmer would like a refill of the following medications:     cyclobenzaprine (FLEXERIL) 10 MG tablet Angélica Mata MD]    Preferred pharmacy: [facility] [address] [address], [facility] [id number] - P [phone] - F [phone]

## 2018-12-31 ENCOUNTER — HOSPITAL ENCOUNTER (OUTPATIENT)
Dept: GENERAL RADIOLOGY | Age: 43
Discharge: HOME | End: 2018-12-31
Payer: COMMERCIAL

## 2018-12-31 ENCOUNTER — TELEPHONE (OUTPATIENT)
Dept: FAMILY MEDICINE CLINIC | Age: 43
End: 2018-12-31

## 2018-12-31 ENCOUNTER — HOSPITAL ENCOUNTER (OUTPATIENT)
Age: 43
Discharge: HOME | End: 2018-12-31
Payer: COMMERCIAL

## 2018-12-31 ENCOUNTER — OFFICE VISIT (OUTPATIENT)
Dept: FAMILY MEDICINE CLINIC | Age: 43
End: 2018-12-31
Payer: COMMERCIAL

## 2018-12-31 VITALS
HEIGHT: 67 IN | DIASTOLIC BLOOD PRESSURE: 72 MMHG | HEART RATE: 92 BPM | SYSTOLIC BLOOD PRESSURE: 110 MMHG | RESPIRATION RATE: 16 BRPM | TEMPERATURE: 98.8 F | BODY MASS INDEX: 36 KG/M2 | WEIGHT: 229.4 LBS

## 2018-12-31 DIAGNOSIS — M76.62 ACHILLES TENDINITIS OF BOTH LOWER EXTREMITIES: ICD-10-CM

## 2018-12-31 DIAGNOSIS — M76.61 ACHILLES TENDINITIS OF BOTH LOWER EXTREMITIES: ICD-10-CM

## 2018-12-31 DIAGNOSIS — M25.562 CHRONIC PAIN OF BOTH KNEES: ICD-10-CM

## 2018-12-31 DIAGNOSIS — M71.20 SYNOVIAL CYST OF POPLITEAL SPACE, UNSPECIFIED LATERALITY: ICD-10-CM

## 2018-12-31 DIAGNOSIS — M25.561 CHRONIC PAIN OF BOTH KNEES: ICD-10-CM

## 2018-12-31 DIAGNOSIS — M54.50 ACUTE MIDLINE LOW BACK PAIN WITHOUT SCIATICA: Primary | ICD-10-CM

## 2018-12-31 DIAGNOSIS — M72.2 PLANTAR FASCIITIS, BILATERAL: ICD-10-CM

## 2018-12-31 DIAGNOSIS — G89.29 CHRONIC PAIN OF BOTH KNEES: ICD-10-CM

## 2018-12-31 DIAGNOSIS — M54.50 ACUTE MIDLINE LOW BACK PAIN WITHOUT SCIATICA: ICD-10-CM

## 2018-12-31 PROCEDURE — 72100 X-RAY EXAM L-S SPINE 2/3 VWS: CPT

## 2018-12-31 PROCEDURE — 73564 X-RAY EXAM KNEE 4 OR MORE: CPT

## 2018-12-31 PROCEDURE — 99214 OFFICE O/P EST MOD 30 MIN: CPT | Performed by: NURSE PRACTITIONER

## 2018-12-31 PROCEDURE — 96372 THER/PROPH/DIAG INJ SC/IM: CPT | Performed by: NURSE PRACTITIONER

## 2018-12-31 RX ORDER — LIDOCAINE 50 MG/G
OINTMENT TOPICAL
Qty: 1 TUBE | Refills: 1 | Status: SHIPPED | OUTPATIENT
Start: 2018-12-31 | End: 2019-03-06

## 2018-12-31 RX ORDER — METHYLPREDNISOLONE ACETATE 80 MG/ML
120 INJECTION, SUSPENSION INTRA-ARTICULAR; INTRALESIONAL; INTRAMUSCULAR; SOFT TISSUE ONCE
Status: COMPLETED | OUTPATIENT
Start: 2018-12-31 | End: 2018-12-31

## 2018-12-31 RX ORDER — PREDNISONE 1 MG/1
TABLET ORAL
Qty: 30 TABLET | Refills: 0 | Status: SHIPPED | OUTPATIENT
Start: 2018-12-31 | End: 2019-01-10

## 2018-12-31 RX ADMIN — METHYLPREDNISOLONE ACETATE 120 MG: 80 INJECTION, SUSPENSION INTRA-ARTICULAR; INTRALESIONAL; INTRAMUSCULAR; SOFT TISSUE at 10:01

## 2019-01-07 ENCOUNTER — TELEPHONE (OUTPATIENT)
Dept: FAMILY MEDICINE CLINIC | Age: 44
End: 2019-01-07

## 2019-01-08 ASSESSMENT — ENCOUNTER SYMPTOMS
BACK PAIN: 1
GASTROINTESTINAL NEGATIVE: 1
RESPIRATORY NEGATIVE: 1
EYES NEGATIVE: 1

## 2019-02-04 ENCOUNTER — OFFICE VISIT (OUTPATIENT)
Dept: FAMILY MEDICINE CLINIC | Age: 44
End: 2019-02-04
Payer: COMMERCIAL

## 2019-02-04 ENCOUNTER — HOSPITAL ENCOUNTER (OUTPATIENT)
Age: 44
Discharge: HOME | End: 2019-02-04
Payer: COMMERCIAL

## 2019-02-04 VITALS
HEIGHT: 68 IN | DIASTOLIC BLOOD PRESSURE: 72 MMHG | RESPIRATION RATE: 16 BRPM | BODY MASS INDEX: 34.25 KG/M2 | WEIGHT: 226 LBS | SYSTOLIC BLOOD PRESSURE: 96 MMHG | TEMPERATURE: 99 F | HEART RATE: 72 BPM

## 2019-02-04 DIAGNOSIS — K57.92 DIVERTICULITIS: ICD-10-CM

## 2019-02-04 DIAGNOSIS — R19.7 DIARRHEA, UNSPECIFIED TYPE: ICD-10-CM

## 2019-02-04 DIAGNOSIS — R10.84 GENERALIZED ABDOMINAL PAIN: Primary | ICD-10-CM

## 2019-02-04 LAB
BASOPHILS # BLD: 0.4 %
BASOPHILS ABSOLUTE: 0 THOU/MM3 (ref 0–0.1)
EOSINOPHIL # BLD: 1.5 %
EOSINOPHILS ABSOLUTE: 0.2 THOU/MM3 (ref 0–0.4)
ERYTHROCYTE [DISTWIDTH] IN BLOOD BY AUTOMATED COUNT: 13.3 % (ref 11.5–14.5)
ERYTHROCYTE [DISTWIDTH] IN BLOOD BY AUTOMATED COUNT: 43.7 FL (ref 35–45)
HCT VFR BLD CALC: 44.7 % (ref 37–47)
HEMOGLOBIN: 14 GM/DL (ref 12–16)
IMMATURE GRANS (ABS): 0.04 THOU/MM3 (ref 0–0.07)
IMMATURE GRANULOCYTES: 0.4 %
LYMPHOCYTES # BLD: 22.9 %
LYMPHOCYTES ABSOLUTE: 2.4 THOU/MM3 (ref 1–4.8)
MCH RBC QN AUTO: 28.4 PG (ref 26–33)
MCHC RBC AUTO-ENTMCNC: 31.3 GM/DL (ref 32.2–35.5)
MCV RBC AUTO: 90.7 FL (ref 81–99)
MONOCYTES # BLD: 5.6 %
MONOCYTES ABSOLUTE: 0.6 THOU/MM3 (ref 0.4–1.3)
NUCLEATED RED BLOOD CELLS: 0 /100 WBC
PLATELET # BLD: 380 THOU/MM3 (ref 130–400)
PMV BLD AUTO: 9.8 FL (ref 9.4–12.4)
RBC # BLD: 4.93 MILL/MM3 (ref 4.2–5.4)
SEG NEUTROPHILS: 69.2 %
SEGMENTED NEUTROPHILS ABSOLUTE COUNT: 7.1 THOU/MM3 (ref 1.8–7.7)
WBC # BLD: 10.3 THOU/MM3 (ref 4.8–10.8)

## 2019-02-04 PROCEDURE — 85025 COMPLETE CBC W/AUTO DIFF WBC: CPT

## 2019-02-04 PROCEDURE — 99213 OFFICE O/P EST LOW 20 MIN: CPT | Performed by: NURSE PRACTITIONER

## 2019-02-04 PROCEDURE — 36415 COLL VENOUS BLD VENIPUNCTURE: CPT

## 2019-02-04 RX ORDER — METRONIDAZOLE 500 MG/1
500 TABLET ORAL 3 TIMES DAILY
Qty: 30 TABLET | Refills: 0 | Status: SHIPPED | OUTPATIENT
Start: 2019-02-04 | End: 2020-02-22 | Stop reason: SDUPTHER

## 2019-02-04 RX ORDER — CIPROFLOXACIN 500 MG/1
500 TABLET, FILM COATED ORAL 2 TIMES DAILY
Qty: 20 TABLET | Refills: 0 | Status: SHIPPED | OUTPATIENT
Start: 2019-02-04 | End: 2020-02-22 | Stop reason: SDUPTHER

## 2019-02-04 RX ORDER — DIPHENOXYLATE HYDROCHLORIDE AND ATROPINE SULFATE 2.5; .025 MG/1; MG/1
1 TABLET ORAL 4 TIMES DAILY PRN
Qty: 40 TABLET | Refills: 0 | Status: SHIPPED | OUTPATIENT
Start: 2019-02-04 | End: 2019-02-14

## 2019-02-13 ASSESSMENT — ENCOUNTER SYMPTOMS
BLOOD IN STOOL: 0
VOMITING: 0
ABDOMINAL PAIN: 1
NAUSEA: 0
RESPIRATORY NEGATIVE: 1
CONSTIPATION: 1
EYES NEGATIVE: 1
DIARRHEA: 1

## 2019-03-06 ENCOUNTER — OFFICE VISIT (OUTPATIENT)
Dept: FAMILY MEDICINE CLINIC | Age: 44
End: 2019-03-06
Payer: COMMERCIAL

## 2019-03-06 VITALS
DIASTOLIC BLOOD PRESSURE: 70 MMHG | HEART RATE: 76 BPM | TEMPERATURE: 99.4 F | RESPIRATION RATE: 20 BRPM | WEIGHT: 223 LBS | BODY MASS INDEX: 34.41 KG/M2 | SYSTOLIC BLOOD PRESSURE: 108 MMHG

## 2019-03-06 DIAGNOSIS — J02.9 EXUDATIVE PHARYNGITIS: Primary | ICD-10-CM

## 2019-03-06 PROCEDURE — 96372 THER/PROPH/DIAG INJ SC/IM: CPT | Performed by: FAMILY MEDICINE

## 2019-03-06 PROCEDURE — 99213 OFFICE O/P EST LOW 20 MIN: CPT | Performed by: FAMILY MEDICINE

## 2019-03-06 RX ORDER — METHYLPREDNISOLONE ACETATE 80 MG/ML
160 INJECTION, SUSPENSION INTRA-ARTICULAR; INTRALESIONAL; INTRAMUSCULAR; SOFT TISSUE ONCE
Status: COMPLETED | OUTPATIENT
Start: 2019-03-06 | End: 2019-03-06

## 2019-03-06 RX ORDER — AMOXICILLIN 500 MG/1
500 CAPSULE ORAL 3 TIMES DAILY
Qty: 30 CAPSULE | Refills: 0 | Status: SHIPPED | OUTPATIENT
Start: 2019-03-06 | End: 2019-03-16

## 2019-03-06 RX ADMIN — METHYLPREDNISOLONE ACETATE 160 MG: 80 INJECTION, SUSPENSION INTRA-ARTICULAR; INTRALESIONAL; INTRAMUSCULAR; SOFT TISSUE at 09:36

## 2019-03-06 ASSESSMENT — ENCOUNTER SYMPTOMS
BACK PAIN: 0
CHEST TIGHTNESS: 0
SORE THROAT: 1
EYES NEGATIVE: 1
ABDOMINAL PAIN: 0
RHINORRHEA: 0
DIARRHEA: 0
VOMITING: 0
SHORTNESS OF BREATH: 0
COUGH: 1
NAUSEA: 0

## 2019-03-24 ENCOUNTER — PATIENT MESSAGE (OUTPATIENT)
Dept: FAMILY MEDICINE CLINIC | Age: 44
End: 2019-03-24

## 2019-03-25 RX ORDER — ZAFIRLUKAST 20 MG/1
20 TABLET, FILM COATED ORAL 2 TIMES DAILY
Qty: 60 TABLET | Refills: 11 | Status: SHIPPED | OUTPATIENT
Start: 2019-03-25 | End: 2020-05-01 | Stop reason: SDUPTHER

## 2019-03-26 RX ORDER — BUPROPION HYDROCHLORIDE 150 MG/1
150 TABLET ORAL EVERY MORNING
Qty: 30 TABLET | Refills: 5 | Status: SHIPPED | OUTPATIENT
Start: 2019-03-26 | End: 2019-10-10 | Stop reason: SDUPTHER

## 2019-04-08 DIAGNOSIS — F32.A DEPRESSION, UNSPECIFIED DEPRESSION TYPE: ICD-10-CM

## 2019-04-09 RX ORDER — ESCITALOPRAM OXALATE 20 MG/1
20 TABLET ORAL DAILY
Qty: 30 TABLET | Refills: 11 | Status: SHIPPED | OUTPATIENT
Start: 2019-04-09 | End: 2020-04-09 | Stop reason: SDUPTHER

## 2019-04-20 ENCOUNTER — APPOINTMENT (OUTPATIENT)
Dept: GENERAL RADIOLOGY | Age: 44
End: 2019-04-20
Payer: OTHER MISCELLANEOUS

## 2019-04-20 ENCOUNTER — HOSPITAL ENCOUNTER (EMERGENCY)
Age: 44
Discharge: HOME | End: 2019-04-20
Attending: EMERGENCY MEDICINE
Payer: OTHER MISCELLANEOUS

## 2019-04-20 VITALS
OXYGEN SATURATION: 99 % | SYSTOLIC BLOOD PRESSURE: 117 MMHG | RESPIRATION RATE: 14 BRPM | HEART RATE: 79 BPM | DIASTOLIC BLOOD PRESSURE: 84 MMHG | TEMPERATURE: 98.8 F

## 2019-04-20 DIAGNOSIS — S93.401A SPRAIN OF RIGHT ANKLE, UNSPECIFIED LIGAMENT, INITIAL ENCOUNTER: Primary | ICD-10-CM

## 2019-04-20 PROCEDURE — 6370000000 HC RX 637 (ALT 250 FOR IP): Performed by: EMERGENCY MEDICINE

## 2019-04-20 PROCEDURE — 2709999900 HC NON-CHARGEABLE SUPPLY

## 2019-04-20 PROCEDURE — 99284 EMERGENCY DEPT VISIT MOD MDM: CPT

## 2019-04-20 PROCEDURE — 73610 X-RAY EXAM OF ANKLE: CPT

## 2019-04-20 RX ORDER — IBUPROFEN 200 MG
600 TABLET ORAL ONCE
Status: COMPLETED | OUTPATIENT
Start: 2019-04-20 | End: 2019-04-20

## 2019-04-20 RX ADMIN — IBUPROFEN 600 MG: 200 TABLET, FILM COATED ORAL at 11:39

## 2019-04-20 ASSESSMENT — ENCOUNTER SYMPTOMS
ABDOMINAL PAIN: 0
SHORTNESS OF BREATH: 0
BACK PAIN: 0

## 2019-04-20 ASSESSMENT — PAIN DESCRIPTION - PAIN TYPE: TYPE: ACUTE PAIN

## 2019-04-20 ASSESSMENT — PAIN DESCRIPTION - ORIENTATION: ORIENTATION: RIGHT

## 2019-04-20 ASSESSMENT — PAIN DESCRIPTION - LOCATION: LOCATION: ANKLE

## 2019-04-20 ASSESSMENT — PAIN SCALES - GENERAL: PAINLEVEL_OUTOF10: 8

## 2019-04-20 NOTE — ED NOTES
Pt fitted with crutches and medicated for pain. Denies questions or needs at this time.       Yusra Joya RN  04/20/19 1126
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
## 2019-04-20 NOTE — ED NOTES
Bed: 006A  Expected date:   Expected time:   Means of arrival: [facility] Dept  Comments:     Maico Salazar RN  04/20/19 1037

## 2019-04-23 ENCOUNTER — TELEPHONE (OUTPATIENT)
Dept: FAMILY MEDICINE CLINIC | Age: 44
End: 2019-04-23

## 2019-05-02 RX ORDER — BUTALBITAL, ACETAMINOPHEN AND CAFFEINE 50; 325; 40 MG/1; MG/1; MG/1
TABLET ORAL
Qty: 90 TABLET | Refills: 2 | Status: SHIPPED | OUTPATIENT
Start: 2019-05-02 | End: 2019-09-18 | Stop reason: SDUPTHER

## 2019-06-03 RX ORDER — ATORVASTATIN CALCIUM 20 MG/1
TABLET, FILM COATED ORAL
Qty: 90 TABLET | Refills: 0 | Status: SHIPPED | OUTPATIENT
Start: 2019-06-03 | End: 2019-09-03 | Stop reason: SDUPTHER

## 2019-06-03 NOTE — TELEPHONE ENCOUNTER
Rx request from [facility] for Liptior 20mg 1 tablet QD  Last written:08/29/2018 90 tablets with 2 refills  Last seen:03/06/2019, No future appointments  Last FLP:05/27/2016, Next FLP:08/21/2019  Rx verified,ordered,and set to escribe

## 2019-06-05 RX ORDER — ATORVASTATIN CALCIUM 20 MG/1
TABLET, FILM COATED ORAL
Qty: 90 TABLET | Refills: 1 | OUTPATIENT
Start: 2019-06-05

## 2019-06-05 NOTE — TELEPHONE ENCOUNTER
Rx request from [facility] for Lipitor 20mg 1 tablet QD  Last written:06/03/2019 90 tablets with 0 refills- sent to same pharmacy  Rx refused.

## 2019-06-11 LAB
ABSOLUTE BASO #: 0 K/UL (ref 0–0.1)
ABSOLUTE EOS #: 0.2 K/UL (ref 0.1–0.4)
ABSOLUTE LYMPH #: 2.2 K/UL (ref 0.8–5.2)
ABSOLUTE MONO #: 0.6 K/UL (ref 0.1–0.9)
ABSOLUTE NEUT #: 5.4 K/UL (ref 1.3–9.1)
ALBUMIN SERPL-MCNC: 4.1 G/DL (ref 3.2–5.3)
ALK PHOSPHATASE: 145 U/L (ref 39–130)
ALT SERPL-CCNC: 20 U/L (ref 0–31)
ANION GAP SERPL CALCULATED.3IONS-SCNC: 9 MMOL/L (ref 4–12)
AST SERPL-CCNC: 16 U/L (ref 0–41)
BASOPHILS RELATIVE PERCENT: 0.5 %
BILIRUB SERPL-MCNC: 0.2 MG/DL (ref 0.3–1.2)
BUN BLDV-MCNC: 11 MG/DL (ref 5–23)
CALCIUM SERPL-MCNC: 9.2 MG/DL (ref 8.5–10.5)
CHLORIDE BLD-SCNC: 102 MMOL/L (ref 98–109)
CHOLESTEROL/HDL RATIO: 3.6 (ref 1–5)
CHOLESTEROL: 193 MG/DL (ref 150–200)
CO2: 27 MMOL/L (ref 22–32)
CREAT SERPL-MCNC: 0.77 MG/DL (ref 0.4–1)
EGFR AFRICAN AMERICAN: >60 ML/MIN/1.73SQ.M
EGFR IF NONAFRICAN AMERICAN: >60 ML/MIN/1.73SQ.M
EOSINOPHILS RELATIVE PERCENT: 2 %
GLUCOSE: 89 MG/DL (ref 65–99)
HCT VFR BLD CALC: 42.2 % (ref 36–48)
HDLC SERPL-MCNC: 53 MG/DL
HEMOGLOBIN: 13.9 G/DL (ref 12–16)
LDL CHOLESTEROL CALCULATED: 118 MG/DL
LDL/HDL RATIO: 2.2
LYMPHOCYTE %: 25.8 %
MCH RBC QN AUTO: 28.5 PG (ref 27–34)
MCHC RBC AUTO-ENTMCNC: 32.9 G/DL (ref 31–36)
MCV RBC AUTO: 86.5 FL (ref 80–100)
MONOCYTES # BLD: 7 %
NEUTROPHILS RELATIVE PERCENT: 64.2 %
PDW BLD-RTO: 12.7 % (ref 10.8–14.8)
PLATELETS: 364 K/UL (ref 150–450)
POTASSIUM SERPL-SCNC: 4.7 MMOL/L (ref 3.5–5)
RBC: 4.88 M/UL (ref 4–5.5)
SODIUM BLD-SCNC: 138 MMOL/L (ref 134–146)
TOTAL PROTEIN: 7 G/DL (ref 6–8)
TRIGL SERPL-MCNC: 109 MG/DL (ref 27–150)
VLDLC SERPL CALC-MCNC: 22 MG/DL (ref 0–30)
WBC: 8.5 K/UL (ref 3.7–10.8)

## 2019-08-07 ENCOUNTER — OFFICE VISIT (OUTPATIENT)
Dept: FAMILY MEDICINE CLINIC | Age: 44
End: 2019-08-07
Payer: COMMERCIAL

## 2019-08-07 VITALS
RESPIRATION RATE: 18 BRPM | TEMPERATURE: 99.8 F | WEIGHT: 273.6 LBS | DIASTOLIC BLOOD PRESSURE: 70 MMHG | HEART RATE: 78 BPM | SYSTOLIC BLOOD PRESSURE: 132 MMHG | BODY MASS INDEX: 42.22 KG/M2

## 2019-08-07 DIAGNOSIS — K52.9 ENTERITIS: Primary | ICD-10-CM

## 2019-08-07 PROCEDURE — 99213 OFFICE O/P EST LOW 20 MIN: CPT | Performed by: FAMILY MEDICINE

## 2019-08-07 ASSESSMENT — ENCOUNTER SYMPTOMS
BACK PAIN: 0
NAUSEA: 0
ABDOMINAL PAIN: 1
DIARRHEA: 1
RHINORRHEA: 0
SORE THROAT: 0
CONSTIPATION: 0
VOMITING: 0

## 2019-08-12 ENCOUNTER — TELEPHONE (OUTPATIENT)
Dept: FAMILY MEDICINE CLINIC | Age: 44
End: 2019-08-12

## 2019-08-12 DIAGNOSIS — R19.7 DIARRHEA, UNSPECIFIED TYPE: ICD-10-CM

## 2019-08-12 DIAGNOSIS — K52.9 ENTERITIS: Primary | ICD-10-CM

## 2019-08-12 NOTE — TELEPHONE ENCOUNTER
Patient would like to have done at [facility] on [employer]. Orders are faxed.

## 2019-08-14 LAB
ABSOLUTE BASO #: 0.1 X10E9/L (ref 0–0.9)
ABSOLUTE EOS #: 0.1 X10E9/L (ref 0–0.4)
ABSOLUTE LYMPH #: 1.8 X10E9/L (ref 1–3.5)
ABSOLUTE MONO #: 0.6 X10E9/L (ref 0–0.9)
ABSOLUTE NEUT #: 7 X10E9/L (ref 1.5–6.6)
ALBUMIN SERPL-MCNC: 4.1 G/DL (ref 3.2–5.3)
ALK PHOSPHATASE: 148 U/L (ref 39–130)
ALT SERPL-CCNC: 17 U/L (ref 0–31)
ANION GAP SERPL CALCULATED.3IONS-SCNC: 11 MMOL/L (ref 4–12)
AST SERPL-CCNC: 13 U/L (ref 0–41)
BASOPHILS RELATIVE PERCENT: 0.7 %
BILIRUB SERPL-MCNC: 0.3 MG/DL (ref 0.3–1.2)
BUN BLDV-MCNC: 7 MG/DL (ref 5–23)
C DIFFICILE TOXIN, EIA: NORMAL
CALCIUM SERPL-MCNC: 9.2 MG/DL (ref 8.5–10.5)
CHLORIDE BLD-SCNC: 105 MMOL/L (ref 98–109)
CO2: 22 MMOL/L (ref 22–32)
CREAT SERPL-MCNC: 0.75 MG/DL (ref 0.4–1)
EGFR AFRICAN AMERICAN: >60 ML/MIN/1.73SQ.M
EGFR IF NONAFRICAN AMERICAN: >60 ML/MIN/1.73SQ.M
EOSINOPHILS RELATIVE PERCENT: 1.3 %
GIARDIA ANTIGEN STOOL: NORMAL
GLUCOSE: 91 MG/DL (ref 65–99)
HCT VFR BLD CALC: 41.4 % (ref 35–47)
HEMOGLOBIN: 13.8 G/DL (ref 11.7–15.5)
LYMPHOCYTE %: 18.6 %
MCH RBC QN AUTO: 28.8 PG (ref 26–33.5)
MCHC RBC AUTO-ENTMCNC: 33.5 G/DL (ref 32–37)
MCV RBC AUTO: 86 FL (ref 81–100)
MONOCYTES # BLD: 5.9 %
NEUTROPHILS RELATIVE PERCENT: 73.5 %
PDW BLD-RTO: 14.1 % (ref 11.5–14.7)
PLATELETS: 379 X10E9/L (ref 150–450)
PMV BLD AUTO: 8.3 FL (ref 7–12)
POTASSIUM SERPL-SCNC: 4.8 MMOL/L (ref 3.5–5)
RBC: 4.81 X10E12/L (ref 3.8–5.2)
SODIUM BLD-SCNC: 138 MMOL/L (ref 134–146)
TOTAL PROTEIN: 6.8 G/DL (ref 6–8)
WBC: 9.6 X10E9/L (ref 4.8–10.8)

## 2019-08-15 LAB — CULTURE, STOOL: NORMAL

## 2019-08-19 ENCOUNTER — TELEPHONE (OUTPATIENT)
Dept: FAMILY MEDICINE CLINIC | Age: 44
End: 2019-08-19

## 2019-08-19 LAB
OVA AND PARASITE TRICHROME: NEGATIVE
OVA AND PARASITES CONCENTRATE: NEGATIVE

## 2019-08-19 NOTE — TELEPHONE ENCOUNTER
----- Message from Cornelius Price MD sent at 8/19/2019  6:32 AM EDT -----  Results are ok. Please let the patient know.

## 2019-09-04 RX ORDER — ATORVASTATIN CALCIUM 20 MG/1
20 TABLET, FILM COATED ORAL DAILY
Qty: 90 TABLET | Refills: 2 | Status: SHIPPED | OUTPATIENT
Start: 2019-09-04 | End: 2020-03-11 | Stop reason: SDUPTHER

## 2019-09-19 RX ORDER — BUTALBITAL, ACETAMINOPHEN AND CAFFEINE 50; 325; 40 MG/1; MG/1; MG/1
TABLET ORAL
Qty: 90 TABLET | Refills: 2 | Status: SHIPPED | OUTPATIENT
Start: 2019-09-19 | End: 2020-01-14 | Stop reason: SDUPTHER

## 2019-10-10 ENCOUNTER — OFFICE VISIT (OUTPATIENT)
Dept: FAMILY MEDICINE CLINIC | Age: 44
End: 2019-10-10
Payer: COMMERCIAL

## 2019-10-10 VITALS
HEART RATE: 84 BPM | DIASTOLIC BLOOD PRESSURE: 66 MMHG | BODY MASS INDEX: 33.12 KG/M2 | SYSTOLIC BLOOD PRESSURE: 104 MMHG | TEMPERATURE: 99.2 F | HEIGHT: 67 IN | WEIGHT: 211 LBS | RESPIRATION RATE: 20 BRPM

## 2019-10-10 DIAGNOSIS — Z00.00 ROUTINE GENERAL MEDICAL EXAMINATION AT A HEALTH CARE FACILITY: Primary | ICD-10-CM

## 2019-10-10 PROCEDURE — 99396 PREV VISIT EST AGE 40-64: CPT | Performed by: FAMILY MEDICINE

## 2019-10-10 RX ORDER — BUPROPION HYDROCHLORIDE 150 MG/1
150 TABLET ORAL EVERY MORNING
Qty: 30 TABLET | Refills: 11 | Status: SHIPPED | OUTPATIENT
Start: 2019-10-10 | End: 2020-12-09 | Stop reason: SDUPTHER

## 2019-10-10 ASSESSMENT — ENCOUNTER SYMPTOMS
VOMITING: 0
COUGH: 0
BACK PAIN: 0
SORE THROAT: 0
EYES NEGATIVE: 1
ABDOMINAL PAIN: 0
SHORTNESS OF BREATH: 0
NAUSEA: 0
RHINORRHEA: 0
DIARRHEA: 0
CHEST TIGHTNESS: 0

## 2019-12-12 ENCOUNTER — OFFICE VISIT (OUTPATIENT)
Dept: FAMILY MEDICINE CLINIC | Age: 44
End: 2019-12-12
Payer: COMMERCIAL

## 2019-12-12 ENCOUNTER — TELEPHONE (OUTPATIENT)
Dept: FAMILY MEDICINE CLINIC | Age: 44
End: 2019-12-12

## 2019-12-12 VITALS
HEART RATE: 100 BPM | SYSTOLIC BLOOD PRESSURE: 118 MMHG | WEIGHT: 216 LBS | BODY MASS INDEX: 33.9 KG/M2 | RESPIRATION RATE: 16 BRPM | TEMPERATURE: 99.5 F | DIASTOLIC BLOOD PRESSURE: 80 MMHG | HEIGHT: 67 IN

## 2019-12-12 DIAGNOSIS — J40 BRONCHITIS: Primary | ICD-10-CM

## 2019-12-12 DIAGNOSIS — J21.9 ACUTE BRONCHIOLITIS DUE TO UNSPECIFIED ORGANISM: ICD-10-CM

## 2019-12-12 PROCEDURE — 99213 OFFICE O/P EST LOW 20 MIN: CPT | Performed by: NURSE PRACTITIONER

## 2019-12-12 RX ORDER — BENZONATATE 200 MG/1
200 CAPSULE ORAL 3 TIMES DAILY PRN
Qty: 30 CAPSULE | Refills: 0 | Status: SHIPPED | OUTPATIENT
Start: 2019-12-12 | End: 2019-12-19

## 2019-12-12 RX ORDER — CEFUROXIME AXETIL 250 MG/1
250 TABLET ORAL 2 TIMES DAILY
Qty: 20 TABLET | Refills: 0 | Status: SHIPPED | OUTPATIENT
Start: 2019-12-12 | End: 2019-12-22

## 2019-12-12 RX ORDER — ALBUTEROL SULFATE 90 UG/1
2 AEROSOL, METERED RESPIRATORY (INHALATION) EVERY 6 HOURS PRN
Qty: 1 INHALER | Refills: 5 | Status: SHIPPED | OUTPATIENT
Start: 2019-12-12 | End: 2020-12-22 | Stop reason: SDUPTHER

## 2019-12-12 RX ORDER — PREDNISONE 10 MG/1
TABLET ORAL
Qty: 30 TABLET | Refills: 0 | Status: SHIPPED | OUTPATIENT
Start: 2019-12-12 | End: 2019-12-22

## 2019-12-17 ASSESSMENT — ENCOUNTER SYMPTOMS
WHEEZING: 1
GASTROINTESTINAL NEGATIVE: 1
SHORTNESS OF BREATH: 1
EYES NEGATIVE: 1
COUGH: 1
CHEST TIGHTNESS: 1

## 2020-01-15 RX ORDER — BUTALBITAL, ACETAMINOPHEN AND CAFFEINE 50; 325; 40 MG/1; MG/1; MG/1
TABLET ORAL
Qty: 90 TABLET | Refills: 2 | Status: SHIPPED | OUTPATIENT
Start: 2020-01-15 | End: 2020-05-08 | Stop reason: SDUPTHER

## 2020-02-22 RX ORDER — METRONIDAZOLE 500 MG/1
500 TABLET ORAL 3 TIMES DAILY
Qty: 30 TABLET | Refills: 0 | Status: SHIPPED | OUTPATIENT
Start: 2020-02-22 | End: 2020-03-03

## 2020-02-22 RX ORDER — CIPROFLOXACIN 500 MG/1
500 TABLET, FILM COATED ORAL 2 TIMES DAILY
Qty: 20 TABLET | Refills: 0 | Status: SHIPPED | OUTPATIENT
Start: 2020-02-22 | End: 2020-03-03

## 2020-03-08 ENCOUNTER — HOSPITAL ENCOUNTER (EMERGENCY)
Age: 45
Discharge: HOME | End: 2020-03-08
Payer: COMMERCIAL

## 2020-03-08 VITALS
HEART RATE: 87 BPM | BODY MASS INDEX: 33.36 KG/M2 | TEMPERATURE: 97.8 F | DIASTOLIC BLOOD PRESSURE: 73 MMHG | WEIGHT: 213 LBS | OXYGEN SATURATION: 98 % | RESPIRATION RATE: 16 BRPM | SYSTOLIC BLOOD PRESSURE: 133 MMHG

## 2020-03-08 PROCEDURE — 99213 OFFICE O/P EST LOW 20 MIN: CPT | Performed by: NURSE PRACTITIONER

## 2020-03-08 PROCEDURE — 99213 OFFICE O/P EST LOW 20 MIN: CPT

## 2020-03-08 RX ORDER — AMOXICILLIN AND CLAVULANATE POTASSIUM 875; 125 MG/1; MG/1
1 TABLET, FILM COATED ORAL 2 TIMES DAILY
Qty: 20 TABLET | Refills: 0 | Status: SHIPPED | OUTPATIENT
Start: 2020-03-08 | End: 2020-03-18

## 2020-03-08 RX ORDER — PREDNISONE 20 MG/1
20 TABLET ORAL 2 TIMES DAILY
Qty: 10 TABLET | Refills: 0 | Status: SHIPPED | OUTPATIENT
Start: 2020-03-08 | End: 2020-03-13

## 2020-03-08 RX ORDER — DEXTROMETHORPHAN HYDROBROMIDE AND PROMETHAZINE HYDROCHLORIDE 15; 6.25 MG/5ML; MG/5ML
5 SYRUP ORAL 4 TIMES DAILY PRN
Qty: 120 ML | Refills: 0 | Status: SHIPPED | OUTPATIENT
Start: 2020-03-08 | End: 2020-03-15

## 2020-03-08 ASSESSMENT — ENCOUNTER SYMPTOMS
BACK PAIN: 0
COUGH: 1
DIARRHEA: 0
SORE THROAT: 0
VOMITING: 0
RHINORRHEA: 0
SHORTNESS OF BREATH: 0
NAUSEA: 0
SINUS PRESSURE: 0
TROUBLE SWALLOWING: 0

## 2020-03-08 NOTE — ED PROVIDER NOTES
pain.   Gastrointestinal: Negative for diarrhea, nausea and vomiting. Genitourinary: Negative for decreased urine volume and difficulty urinating. Musculoskeletal: Negative for back pain, myalgias and neck stiffness. Skin: Negative for rash. Allergic/Immunologic: Negative for environmental allergies. Neurological: Negative for dizziness, light-headedness and headaches. Hematological: Negative for adenopathy. PAST MEDICAL HISTORY         Diagnosis Date    Anxiety     Arthritis     Asthma     Depression     Diverticulitis     Hiatal hernia     Hyperlipidemia     Obesity        SURGICALHISTORY     Patient  has a past surgical history that includes knee surgery (1990); laparoscopy (6364); Ureter stent placement (2004); and Cholecystectomy, laparoscopic (3/4/13).     CURRENT MEDICATIONS       Discharge Medication List as of 3/8/2020 10:29 AM      CONTINUE these medications which have NOT CHANGED    Details   albuterol sulfate HFA (PROAIR HFA) 108 (90 Base) MCG/ACT inhaler Inhale 2 puffs into the lungs every 6 hours as needed for Wheezing, Disp-1 Inhaler, R-5Normal      buPROPion (WELLBUTRIN XL) 150 MG extended release tablet Take 1 tablet by mouth every morning, Disp-30 tablet, R-11Normal      atorvastatin (LIPITOR) 20 MG tablet Take 1 tablet by mouth daily, Disp-90 tablet, R-2Normal      escitalopram (LEXAPRO) 20 MG tablet Take 1 tablet by mouth daily, Disp-30 tablet, R-11Normal      zafirlukast (ACCOLATE) 20 MG tablet Take 1 tablet by mouth 2 times daily, Disp-60 tablet, R-11Normal      diclofenac sodium 1 % GEL Apply 4 g topically 4 times daily, Topical, 4 TIMES DAILY Starting Mon 12/31/2018, Disp-4 Tube, R-1, Normal      cyclobenzaprine (FLEXERIL) 10 MG tablet Take 1 tablet by mouth every 8 hours as needed for Muscle spasms, Disp-30 tablet, R-1Normal      albuterol (PROVENTIL) (5 MG/ML) 0.5% nebulizer solution Take 0.5 mLs by nebulization every 6 hours as needed for Wheezing, Disp-30 vial, submental, submandibular, tonsillar, preauricular, posterior auricular or occipital adenopathy. Left side of head: No submental, submandibular, tonsillar, preauricular, posterior auricular or occipital adenopathy. Cervical: No cervical adenopathy. Right cervical: No superficial, deep or posterior cervical adenopathy. Left cervical: No superficial, deep or posterior cervical adenopathy. Upper Body:      Right upper body: No supraclavicular adenopathy. Left upper body: No supraclavicular adenopathy. Skin:     General: Skin is warm and dry. Capillary Refill: Capillary refill takes less than 2 seconds. Findings: No rash. Neurological:      Mental Status: She is alert and oriented to person, place, and time. Psychiatric:         Mood and Affect: Mood normal.         Behavior: Behavior normal. Behavior is cooperative. DIAGNOSTIC RESULTS     Labs:No results found for this visit on 03/08/20. IMAGING:    No orders to display         EKG:      URGENT CARE COURSE:     Vitals:    03/08/20 1013   BP: 133/73   Pulse: 87   Resp: 16   Temp: 97.8 °F (36.6 °C)   TempSrc: Temporal   SpO2: 98%   Weight: 213 lb (96.6 kg)       Medications - No data to display         PROCEDURES:  None    FINAL IMPRESSION      1. Acute bronchitis, unspecified organism    2. Personal history of asthma          DISPOSITION/ PLAN      I did discuss clinical findings with the patient as well as vital signs in assessment findings. He was advised that the Patient has signs and symptoms of upper respiratory infection or bronchitis. Patient is afebrile and stable. Patient can use Tylenol and/or OTC cough syrup. Avoid tobacco use/exposure,Take medication as directed,Drink Lots of fluids and Use Inhalers as directed if prescribed. Advised to follow up with family doctor in the next 2-3 days for reevaluation. The patient may return to urgent care if does not get better or symptoms worsen.   However the patient is advised to go to ER immediately if present symptoms worsen, high fever >102 , vomiting, breathing difficulty, chest pain, lethargy or new symptoms develop. Patient/ parents understands this approach of home management and agrees to the treatment plan.       PATIENT REFERRED TO:  Stephany Garcia MD  [address] [place] [address] / [place] [address]      DISCHARGE MEDICATIONS:  Discharge Medication List as of 3/8/2020 10:29 AM      START taking these medications    Details   amoxicillin-clavulanate (AUGMENTIN) 875-125 MG per tablet Take 1 tablet by mouth 2 times daily for 10 days, Disp-20 tablet, R-0Normal      promethazine-dextromethorphan (PROMETHAZINE-DM) 6.25-15 MG/5ML syrup Take 5 mLs by mouth 4 times daily as needed for Cough, Disp-120 mL, R-0Normal      predniSONE (DELTASONE) 20 MG tablet Take 1 tablet by mouth 2 times daily for 5 days, Disp-10 tablet, R-0Normal             Discharge Medication List as of 3/8/2020 10:29 AM          Discharge Medication List as of 3/8/2020 10:29 AM          BERE Bo CNP    (Please note that portions of this note were completed with a voice recognition program. Efforts were made to edit the dictations but occasionally words are mis-transcribed.)           BERE Bo CNP  03/08/20 2310

## 2020-03-12 RX ORDER — ATORVASTATIN CALCIUM 20 MG/1
20 TABLET, FILM COATED ORAL DAILY
Qty: 90 TABLET | Refills: 2 | Status: SHIPPED | OUTPATIENT
Start: 2020-03-12 | End: 2020-12-09 | Stop reason: SDUPTHER

## 2020-03-18 ENCOUNTER — TELEPHONE (OUTPATIENT)
Dept: FAMILY MEDICINE CLINIC | Age: 45
End: 2020-03-18

## 2020-03-18 RX ORDER — ALBUTEROL SULFATE 2.5 MG/3ML
2.5 SOLUTION RESPIRATORY (INHALATION) EVERY 6 HOURS PRN
Qty: 25 VIAL | Refills: 1 | Status: SHIPPED | OUTPATIENT
Start: 2020-03-18

## 2020-03-18 NOTE — TELEPHONE ENCOUNTER
Pharmacist at [facility] states albuterol was sent over for 5mg/ml 0.5% and this is not available and previously had albuterol 2.5mg/3ml 0.083% 1 unit dose q6 hrs prn. #25vials/1rf was escribed per V. O

## 2020-04-10 ENCOUNTER — TELEPHONE (OUTPATIENT)
Dept: FAMILY MEDICINE CLINIC | Age: 45
End: 2020-04-10

## 2020-04-10 RX ORDER — ESCITALOPRAM OXALATE 20 MG/1
20 TABLET ORAL DAILY
Qty: 30 TABLET | Refills: 11 | Status: SHIPPED | OUTPATIENT
Start: 2020-04-10 | End: 2021-04-11 | Stop reason: SDUPTHER

## 2020-04-10 RX ORDER — PREDNISONE 10 MG/1
TABLET ORAL
Qty: 30 TABLET | Refills: 0 | Status: SHIPPED | OUTPATIENT
Start: 2020-04-10 | End: 2020-04-20

## 2020-04-27 RX ORDER — CYCLOBENZAPRINE HCL 10 MG
10 TABLET ORAL EVERY 8 HOURS PRN
Qty: 30 TABLET | Refills: 1 | Status: SHIPPED | OUTPATIENT
Start: 2020-04-27 | End: 2020-12-22 | Stop reason: SDUPTHER

## 2020-05-04 RX ORDER — ZAFIRLUKAST 20 MG/1
20 TABLET, FILM COATED ORAL 2 TIMES DAILY
Qty: 60 TABLET | Refills: 5 | Status: SHIPPED | OUTPATIENT
Start: 2020-05-04 | End: 2020-11-16

## 2020-05-11 RX ORDER — BUTALBITAL, ACETAMINOPHEN AND CAFFEINE 50; 325; 40 MG/1; MG/1; MG/1
TABLET ORAL
Qty: 90 TABLET | Refills: 2 | Status: SHIPPED | OUTPATIENT
Start: 2020-05-11 | End: 2020-09-01 | Stop reason: SDUPTHER

## 2020-09-01 RX ORDER — BUTALBITAL, ACETAMINOPHEN AND CAFFEINE 50; 325; 40 MG/1; MG/1; MG/1
TABLET ORAL
Qty: 90 TABLET | Refills: 1 | Status: SHIPPED | OUTPATIENT
Start: 2020-09-01 | End: 2020-11-16

## 2020-11-16 RX ORDER — ZAFIRLUKAST 20 MG/1
TABLET, FILM COATED ORAL
Qty: 60 TABLET | Refills: 0 | Status: SHIPPED | OUTPATIENT
Start: 2020-11-16 | End: 2020-12-09 | Stop reason: SDUPTHER

## 2020-11-16 RX ORDER — BUTALBITAL, ACETAMINOPHEN AND CAFFEINE 50; 325; 40 MG/1; MG/1; MG/1
TABLET ORAL
Qty: 90 TABLET | Refills: 0 | Status: SHIPPED | OUTPATIENT
Start: 2020-11-16 | End: 2020-12-22 | Stop reason: SDUPTHER

## 2020-12-09 ENCOUNTER — PATIENT MESSAGE (OUTPATIENT)
Dept: FAMILY MEDICINE CLINIC | Age: 45
End: 2020-12-09

## 2020-12-09 RX ORDER — ATORVASTATIN CALCIUM 20 MG/1
20 TABLET, FILM COATED ORAL DAILY
Qty: 90 TABLET | Refills: 2 | OUTPATIENT
Start: 2020-12-09

## 2020-12-09 RX ORDER — ZAFIRLUKAST 20 MG/1
TABLET, FILM COATED ORAL
Qty: 180 TABLET | Refills: 0 | Status: SHIPPED | OUTPATIENT
Start: 2020-12-09 | End: 2020-12-23 | Stop reason: SDUPTHER

## 2020-12-09 RX ORDER — BUPROPION HYDROCHLORIDE 150 MG/1
150 TABLET ORAL EVERY MORNING
Qty: 30 TABLET | Refills: 11 | OUTPATIENT
Start: 2020-12-09

## 2020-12-09 RX ORDER — ATORVASTATIN CALCIUM 20 MG/1
20 TABLET, FILM COATED ORAL DAILY
Qty: 90 TABLET | Refills: 0 | Status: SHIPPED | OUTPATIENT
Start: 2020-12-09 | End: 2021-03-14 | Stop reason: SDUPTHER

## 2020-12-09 RX ORDER — BUPROPION HYDROCHLORIDE 150 MG/1
150 TABLET ORAL EVERY MORNING
Qty: 90 TABLET | Refills: 0 | Status: SHIPPED | OUTPATIENT
Start: 2020-12-09 | End: 2021-03-28 | Stop reason: SDUPTHER

## 2020-12-09 NOTE — TELEPHONE ENCOUNTER
Patient due for annual routine visit. Last routine visit 10/10/2019. Sent [employer] message to make appt.

## 2020-12-09 NOTE — TELEPHONE ENCOUNTER
Patient needs routine OV. Last visit was 10/10/2019. [employer] message sent to patient to make follow up appt for more refills.

## 2020-12-17 RX ORDER — ZAFIRLUKAST 20 MG/1
TABLET, FILM COATED ORAL
Qty: 180 TABLET | Refills: 0 | OUTPATIENT
Start: 2020-12-17

## 2020-12-22 ENCOUNTER — OFFICE VISIT (OUTPATIENT)
Dept: FAMILY MEDICINE CLINIC | Age: 45
End: 2020-12-22
Payer: COMMERCIAL

## 2020-12-22 VITALS
OXYGEN SATURATION: 98 % | SYSTOLIC BLOOD PRESSURE: 112 MMHG | TEMPERATURE: 98 F | DIASTOLIC BLOOD PRESSURE: 76 MMHG | HEART RATE: 76 BPM | BODY MASS INDEX: 34.53 KG/M2 | WEIGHT: 227.8 LBS | HEIGHT: 68 IN | RESPIRATION RATE: 16 BRPM

## 2020-12-22 PROCEDURE — 99214 OFFICE O/P EST MOD 30 MIN: CPT | Performed by: NURSE PRACTITIONER

## 2020-12-22 RX ORDER — ALBUTEROL SULFATE 90 UG/1
2 AEROSOL, METERED RESPIRATORY (INHALATION) EVERY 6 HOURS PRN
Qty: 1 INHALER | Refills: 5 | Status: SHIPPED | OUTPATIENT
Start: 2020-12-22

## 2020-12-22 RX ORDER — CYCLOBENZAPRINE HCL 10 MG
10 TABLET ORAL EVERY 8 HOURS PRN
Qty: 30 TABLET | Refills: 1 | Status: SHIPPED | OUTPATIENT
Start: 2020-12-22 | End: 2021-10-17 | Stop reason: SDUPTHER

## 2020-12-22 RX ORDER — BUTALBITAL, ACETAMINOPHEN AND CAFFEINE 50; 325; 40 MG/1; MG/1; MG/1
TABLET ORAL
Qty: 90 TABLET | Refills: 0 | Status: SHIPPED | OUTPATIENT
Start: 2020-12-22 | End: 2021-01-29 | Stop reason: SDUPTHER

## 2020-12-22 SDOH — ECONOMIC STABILITY: FOOD INSECURITY: WITHIN THE PAST 12 MONTHS, THE FOOD YOU BOUGHT JUST DIDN'T LAST AND YOU DIDN'T HAVE MONEY TO GET MORE.: NEVER TRUE

## 2020-12-22 SDOH — ECONOMIC STABILITY: TRANSPORTATION INSECURITY
IN THE PAST 12 MONTHS, HAS THE LACK OF TRANSPORTATION KEPT YOU FROM MEDICAL APPOINTMENTS OR FROM GETTING MEDICATIONS?: NO

## 2020-12-22 SDOH — ECONOMIC STABILITY: TRANSPORTATION INSECURITY
IN THE PAST 12 MONTHS, HAS LACK OF TRANSPORTATION KEPT YOU FROM MEETINGS, WORK, OR FROM GETTING THINGS NEEDED FOR DAILY LIVING?: NO

## 2020-12-22 SDOH — ECONOMIC STABILITY: INCOME INSECURITY: HOW HARD IS IT FOR YOU TO PAY FOR THE VERY BASICS LIKE FOOD, HOUSING, MEDICAL CARE, AND HEATING?: NOT HARD AT ALL

## 2020-12-22 SDOH — ECONOMIC STABILITY: FOOD INSECURITY: WITHIN THE PAST 12 MONTHS, YOU WORRIED THAT YOUR FOOD WOULD RUN OUT BEFORE YOU GOT MONEY TO BUY MORE.: NEVER TRUE

## 2020-12-22 ASSESSMENT — ENCOUNTER SYMPTOMS
NAUSEA: 0
VOMITING: 0
DIARRHEA: 1
ABDOMINAL PAIN: 0
BLOOD IN STOOL: 0

## 2020-12-22 NOTE — PROGRESS NOTES
Mother     High Blood Pressure Maternal Grandmother     Glaucoma Maternal Grandmother     Stroke Maternal Grandfather     High Cholesterol Paternal Grandmother     Other Paternal Grandmother         begnin brain tumor     Social History     Tobacco Use    Smoking status: Never Smoker    Smokeless tobacco: Never Used   Substance Use Topics    Alcohol use: No     Alcohol/week: 0.0 standard drinks      Current Outpatient Medications   Medication Sig Dispense Refill    butalbital-acetaminophen-caffeine (FIORICET, ESGIC) -40 MG per tablet TAKE 1 TO 2 TABLETS BY MOUTH EVERY 6 HOURS AS NEEDED FOR HEADACHE 90 tablet 0    cyclobenzaprine (FLEXERIL) 10 MG tablet Take 1 tablet by mouth every 8 hours as needed for Muscle spasms 30 tablet 1    albuterol sulfate HFA (PROAIR HFA) 108 (90 Base) MCG/ACT inhaler Inhale 2 puffs into the lungs every 6 hours as needed for Wheezing 1 Inhaler 5    diclofenac sodium (VOLTAREN) 1 % GEL Apply 4 g topically 4 times daily as needed for Pain 100 g 1    Eluxadoline 100 MG TABS Take 100 mg by mouth 2 times daily for 90 days.  180 tablet 1    zafirlukast (ACCOLATE) 20 MG tablet Take 1 tablet by mouth twice daily 180 tablet 0    atorvastatin (LIPITOR) 20 MG tablet Take 1 tablet by mouth daily 90 tablet 0    buPROPion (WELLBUTRIN XL) 150 MG extended release tablet Take 1 tablet by mouth every morning 90 tablet 0    escitalopram (LEXAPRO) 20 MG tablet Take 1 tablet by mouth daily 30 tablet 11    albuterol (PROVENTIL) (2.5 MG/3ML) 0.083% nebulizer solution Take 3 mLs by nebulization every 6 hours as needed for Wheezing 25 vial 1    diclofenac sodium 1 % GEL Apply 4 g topically 4 times daily 4 Tube 1    dicyclomine (BENTYL) 20 MG tablet TAKE 1 TABLET BY MOUTH THREE TIMES DAILY AS NEEDED FOR ABDOMINAL CRAMPS 60 tablet 0    sucralfate (CARAFATE) 1 GM tablet Take 1 g by mouth 2 times daily   11    dexlansoprazole (DEXILANT) 60 MG CPDR capsule Take 60 mg by mouth daily       No Impression/Plan:  1. Anxiety    2. Mild intermittent asthma, unspecified whether complicated    3. Depression, unspecified depression type    4. Irritable bowel syndrome with diarrhea    5. Gastroesophageal reflux disease, unspecified whether esophagitis present    6. Mixed hyperlipidemia      Requested Prescriptions     Signed Prescriptions Disp Refills    butalbital-acetaminophen-caffeine (FIORICET, ESGIC) -40 MG per tablet 90 tablet 0     Sig: TAKE 1 TO 2 TABLETS BY MOUTH EVERY 6 HOURS AS NEEDED FOR HEADACHE    cyclobenzaprine (FLEXERIL) 10 MG tablet 30 tablet 1     Sig: Take 1 tablet by mouth every 8 hours as needed for Muscle spasms    albuterol sulfate HFA (PROAIR HFA) 108 (90 Base) MCG/ACT inhaler 1 Inhaler 5     Sig: Inhale 2 puffs into the lungs every 6 hours as needed for Wheezing    diclofenac sodium (VOLTAREN) 1 %  g 1     Sig: Apply 4 g topically 4 times daily as needed for Pain    Eluxadoline 100 MG TABS 180 tablet 1     Sig: Take 100 mg by mouth 2 times daily for 90 days. Orders Placed This Encounter   Procedures    Comprehensive Metabolic Panel     Standing Status:   Future     Standing Expiration Date:   12/22/2021    Lipid Panel     Standing Status:   Future     Standing Expiration Date:   12/22/2021     Order Specific Question:   Is Patient Fasting?/# of Hours     Answer:   yes/12    TSH without Reflex     Standing Status:   Future     Standing Expiration Date:   12/22/2021    T4, Free     Standing Status:   Future     Standing Expiration Date:   12/22/2021       Patient giveneducational materials - see patient instructions. Discussed use, benefit, and side effects of prescribed medications. All patient questions answered. Pt voiced understanding. Reviewed health maintenance. Patient agreedwith treatment plan. Follow up as directed. Continue medications as prescribed. Educational information on above diagnosis  provided per AVS.  Routine labs ordered. Trial of Viberzi.   Refills provided  20 min  Electronically signed by BERE Skinner CNP on 12/22/2020 at 1:08 PM

## 2020-12-23 RX ORDER — ZAFIRLUKAST 20 MG/1
TABLET, FILM COATED ORAL
Qty: 60 TABLET | Refills: 0 | Status: SHIPPED | OUTPATIENT
Start: 2020-12-23 | End: 2021-07-21 | Stop reason: ALTCHOICE

## 2020-12-23 NOTE — TELEPHONE ENCOUNTER
Demar Ibarra called requesting a refill on the following medications:  Requested Prescriptions     Pending Prescriptions Disp Refills    zafirlukast (ACCOLATE) 20 MG tablet 180 tablet 0     Sig: Take 1 tablet by mouth twice daily     Originally requested on 12/16/2020  [facility] [address] is out of this medication and will not be receiving another shipment until after the new year. Start Date: 12/09/2020  Disp-180 tab, R-0  90 day supply    Pharmacy verified  [facility] on [address] [place] [address]  Pt confirmed they currently have 60 tablets available    Pt states to give her a call if there are any questions. Pt states \"I would really like to be able to take one tonight so I can breathe. \"    Date of last visit: 12/22/2020  Date of next visit (if applicable): Visit date not found

## 2021-01-08 NOTE — TELEPHONE ENCOUNTER
Denial scanned in for Viberzi 100mg from [facility]. States has the patient experienced an inadequate response with a 14 day trial of at least two of the preferred medication- Bentyl, lomotil or loperamide.

## 2021-01-12 NOTE — TELEPHONE ENCOUNTER
Patient has taken Bentyl and Lomotil with minimal success recommend to attempt prior Auth Viberzi. Maya for samples.

## 2021-01-21 RX ORDER — MONTELUKAST SODIUM 10 MG/1
10 TABLET ORAL DAILY
Qty: 30 TABLET | Refills: 1 | Status: SHIPPED | OUTPATIENT
Start: 2021-01-21 | End: 2021-03-23

## 2021-01-21 NOTE — TELEPHONE ENCOUNTER
Patient's Accolate has not been available at pharmacies for the last month. She wonders if you can switch her to something else for her asthma.

## 2021-02-01 RX ORDER — BUTALBITAL, ACETAMINOPHEN AND CAFFEINE 50; 325; 40 MG/1; MG/1; MG/1
TABLET ORAL
Qty: 90 TABLET | Refills: 0 | Status: SHIPPED | OUTPATIENT
Start: 2021-02-01 | End: 2021-03-13 | Stop reason: SDUPTHER

## 2021-03-08 ENCOUNTER — HOSPITAL ENCOUNTER (OUTPATIENT)
Age: 46
Discharge: HOME | End: 2021-03-08
Payer: COMMERCIAL

## 2021-03-08 LAB
BASOPHILS # BLD: 0.5 %
BASOPHILS ABSOLUTE: 0 THOU/MM3 (ref 0–0.1)
EOSINOPHIL # BLD: 2.1 %
EOSINOPHILS ABSOLUTE: 0.2 THOU/MM3 (ref 0–0.4)
ERYTHROCYTE [DISTWIDTH] IN BLOOD BY AUTOMATED COUNT: 13.5 % (ref 11.5–14.5)
ERYTHROCYTE [DISTWIDTH] IN BLOOD BY AUTOMATED COUNT: 43.9 FL (ref 35–45)
HCT VFR BLD CALC: 42.2 % (ref 37–47)
HEMOGLOBIN: 13.2 GM/DL (ref 12–16)
IMMATURE GRANS (ABS): 0.03 THOU/MM3 (ref 0–0.07)
IMMATURE GRANULOCYTES: 0.3 %
LYMPHOCYTES # BLD: 26.2 %
LYMPHOCYTES ABSOLUTE: 2.5 THOU/MM3 (ref 1–4.8)
MCH RBC QN AUTO: 27.8 PG (ref 26–33)
MCHC RBC AUTO-ENTMCNC: 31.3 GM/DL (ref 32.2–35.5)
MCV RBC AUTO: 89 FL (ref 81–99)
MONOCYTES # BLD: 5.3 %
MONOCYTES ABSOLUTE: 0.5 THOU/MM3 (ref 0.4–1.3)
NUCLEATED RED BLOOD CELLS: 0 /100 WBC
PLATELET # BLD: 445 THOU/MM3 (ref 130–400)
PMV BLD AUTO: 9.8 FL (ref 9.4–12.4)
RBC # BLD: 4.74 MILL/MM3 (ref 4.2–5.4)
SEG NEUTROPHILS: 65.6 %
SEGMENTED NEUTROPHILS ABSOLUTE COUNT: 6.4 THOU/MM3 (ref 1.8–7.7)
WBC # BLD: 9.7 THOU/MM3 (ref 4.8–10.8)

## 2021-03-08 PROCEDURE — U0003 INFECTIOUS AGENT DETECTION BY NUCLEIC ACID (DNA OR RNA); SEVERE ACUTE RESPIRATORY SYNDROME CORONAVIRUS 2 (SARS-COV-2) (CORONAVIRUS DISEASE [COVID-19]), AMPLIFIED PROBE TECHNIQUE, MAKING USE OF HIGH THROUGHPUT TECHNOLOGIES AS DESCRIBED BY CMS-2020-01-R: HCPCS

## 2021-03-08 PROCEDURE — 36415 COLL VENOUS BLD VENIPUNCTURE: CPT

## 2021-03-08 PROCEDURE — 85025 COMPLETE CBC W/AUTO DIFF WBC: CPT

## 2021-03-09 LAB — SARS-COV-2: NOT DETECTED

## 2021-03-09 NOTE — PROGRESS NOTES
NPO after midnight except sip of water with heart/BP meds  Follow all instructions given by surgeon including medications to hold  Bring insurance card and photo ID  Shower the night before or morning of procedure with liquid antibacterial soap  Wear comfortable clothing  Do not bring jewelry or valuables  Bring list of medications with dosage and how often taken if not reviewed   needed at discharge at least 25years old  Call PAT at [phone] for questions    Instructed to call surgery center at [phone] upon arrival to speak with  before entering building. Covid screen due  at [facility] 6 to 7 days before procedure.  Pt had COVID screen done at [facility] 3/8 - no results at this time

## 2021-03-14 NOTE — H&P
Department of Obstetrics and Gynecology   History & Physical    Pt Name: Leticia Mike  MRN: [id number] [place] #: [de-identified]  YOB: 1975    HPI: The patient is a 39 y.o. [id number] female  who presents today for hysteroscopy D&C and endometrial ablation for menorrhagia. She presented to the office for her annual exam c/o very heavy period. She would often have to leave work and felt faint/weak when on her menses. She underwent a trial of OCPs without any improvement in symptoms. She elected to proceed with surgical management. Allergies: Allergies as of 02/17/2021    (No Known Allergies)       Medications:    Current Facility-Administered Medications:     lactated ringers infusion, , Intravenous, Continuous, Carmelita Barbosa MD    ondansetron [facility]) injection 8 mg, 8 mg, Intravenous, Q8H PRN, Carmelita Barbosa MD    scopolamine (TRANSDERM-SCOP) transdermal patch 1 patch, 1 patch, Transdermal, Once, Misael Salinas DO, 1 patch at 03/15/21 1210    scopolamine (TRANSDERM-SCOP) 1 MG/3DAYS transdermal patch, , , ,     promethazine (PHENERGAN) injection 12.5 mg, 12.5 mg, Intramuscular, Once PRN, Misael Salinas DO    labetalol (NORMODYNE;TRANDATE) injection syringe 10 mg, 10 mg, Intravenous, Q10 Min PRN, Misael Salinas,     fentaNYL (SUBLIMAZE) injection 25 mcg, 25 mcg, Intravenous, Q5 Min PRN, Misael Salinas, DO    fentaNYL (SUBLIMAZE) injection 50 mcg, 50 mcg, Intravenous, Q5 Min PRN, Misael Salinas,     [facility] History: [id number] SVDs    Gyn History: Denies h/o abnormal pap smear, h/o STDs.      Past Medical History:   Past Medical History:   Diagnosis Date    Anxiety     Arthritis     Asthma     Depression     Diverticulitis     GERD (gastroesophageal reflux disease)     Hiatal hernia     Hyperlipidemia     Migraines     Obesity    IBS    Past Surgical History:   Past Surgical History:   Procedure Laterality Date    CHOLECYSTECTOMY, LAPAROSCOPIC  3/4/13    Dr. Junior Mark [address]    right     LAPAROSCOPY      exploratory-Dr. Regina Hunt URETER STENT PLACEMENT      Dr. Keyon Eaton       Social History:   Social History     Tobacco Use   Smoking Status Never Smoker   Smokeless Tobacco Never Used        Family History: Noncontributory; Denies h/o cancer. ROS:  Negative except as stated in HPI    PE:  Vitals:    03/15/21 1208   BP: 134/67   Pulse: 100   Resp: 16   Temp: 97.6 °F (36.4 °C)   SpO2: 96%       General: well nourished, well developed, in no acute distress  Resp: breathing unlabored  Abdomen: Nontender, no rebound, no guarding  Pelvic: deferred to the OR    Labs:   Lab Results   Component Value Date    WBC 9.7 2021    HGB 13.2 2021    HCT 42.2 2021    MCV 89.0 2021     (H) 2021 EMB: negative  2020: TSH normal    Imagin/2020 u/s: UTERINE POSITION: retroverted   UTERINE TEXTURE: homogeneous   UTERUS: L: 8.5 centimeters W: 5.6 centimeters AP: 5.2 centimeters   ESTIMATED UTERINE VOLUME: 132 ML   UTERINE LININ.6 millimeters   UTERINE CAVITY: smooth   CUL DE SAC: no fluid present   PRESENCE OF FIBROIDS: negative   RIGHT OVARY: 2.2 centimeters by 1.8 centimeters by 1.5 centimeters ; normal consistency ; bloodflow is demonstrated in the ovarian parenchyma   LEFT OVARY: 2.4 centimeters by 2.5 centimeters by 2.4 centimeters ; normal consistency ; bloodflow is demonstrated in the ovarian parenchyma; there is a 22QJ follicle   CERVIX: normal consistency       Assessment: 39 y.o. N2S0000 female with menorrhagia, failed medical management    Plan:   Proceed with hysteroscopy D&C and endometrial ablation. Her  has a vasectomy and she declines sterilization. She will rely on her . I discussed the risks of the procedure including but not limited to bleeding, infection, injury to bowel/bladder and uterus. I discussed the risks of anesthesia and expectations of bleeding following the procedure.  All

## 2021-03-15 ENCOUNTER — ANESTHESIA (OUTPATIENT)
Dept: OPERATING ROOM | Age: 46
End: 2021-03-15
Payer: COMMERCIAL

## 2021-03-15 ENCOUNTER — ANESTHESIA EVENT (OUTPATIENT)
Dept: OPERATING ROOM | Age: 46
End: 2021-03-15
Payer: COMMERCIAL

## 2021-03-15 ENCOUNTER — HOSPITAL ENCOUNTER (OUTPATIENT)
Age: 46
Setting detail: OUTPATIENT SURGERY
Discharge: HOME | End: 2021-03-15
Attending: OBSTETRICS & GYNECOLOGY | Admitting: OBSTETRICS & GYNECOLOGY
Payer: COMMERCIAL

## 2021-03-15 VITALS
OXYGEN SATURATION: 97 % | SYSTOLIC BLOOD PRESSURE: 102 MMHG | RESPIRATION RATE: 18 BRPM | DIASTOLIC BLOOD PRESSURE: 60 MMHG

## 2021-03-15 VITALS
WEIGHT: 231 LBS | DIASTOLIC BLOOD PRESSURE: 56 MMHG | HEART RATE: 74 BPM | RESPIRATION RATE: 12 BRPM | OXYGEN SATURATION: 97 % | TEMPERATURE: 97.3 F | HEIGHT: 66 IN | SYSTOLIC BLOOD PRESSURE: 110 MMHG | BODY MASS INDEX: 37.12 KG/M2

## 2021-03-15 DIAGNOSIS — G89.18 POST-OP PAIN: Primary | ICD-10-CM

## 2021-03-15 LAB — PREGNANCY, URINE: NEGATIVE

## 2021-03-15 PROCEDURE — 2720000010 HC SURG SUPPLY STERILE: Performed by: OBSTETRICS & GYNECOLOGY

## 2021-03-15 PROCEDURE — C1886 CATHETER, ABLATION: HCPCS | Performed by: OBSTETRICS & GYNECOLOGY

## 2021-03-15 PROCEDURE — 7100000000 HC PACU RECOVERY - FIRST 15 MIN: Performed by: OBSTETRICS & GYNECOLOGY

## 2021-03-15 PROCEDURE — 7100000001 HC PACU RECOVERY - ADDTL 15 MIN: Performed by: OBSTETRICS & GYNECOLOGY

## 2021-03-15 PROCEDURE — C1758 CATHETER, URETERAL: HCPCS | Performed by: OBSTETRICS & GYNECOLOGY

## 2021-03-15 PROCEDURE — 6370000000 HC RX 637 (ALT 250 FOR IP): Performed by: ANESTHESIOLOGY

## 2021-03-15 PROCEDURE — 2709999900 HC NON-CHARGEABLE SUPPLY: Performed by: OBSTETRICS & GYNECOLOGY

## 2021-03-15 PROCEDURE — 3600000013 HC SURGERY LEVEL 3 ADDTL 15MIN: Performed by: OBSTETRICS & GYNECOLOGY

## 2021-03-15 PROCEDURE — 6360000002 HC RX W HCPCS: Performed by: REGISTERED NURSE

## 2021-03-15 PROCEDURE — 81025 URINE PREGNANCY TEST: CPT

## 2021-03-15 PROCEDURE — 3700000000 HC ANESTHESIA ATTENDED CARE: Performed by: OBSTETRICS & GYNECOLOGY

## 2021-03-15 PROCEDURE — 6370000000 HC RX 637 (ALT 250 FOR IP)

## 2021-03-15 PROCEDURE — 3600000003 HC SURGERY LEVEL 3 BASE: Performed by: OBSTETRICS & GYNECOLOGY

## 2021-03-15 PROCEDURE — 2580000003 HC RX 258: Performed by: REGISTERED NURSE

## 2021-03-15 PROCEDURE — 7100000011 HC PHASE II RECOVERY - ADDTL 15 MIN: Performed by: OBSTETRICS & GYNECOLOGY

## 2021-03-15 PROCEDURE — 3700000001 HC ADD 15 MINUTES (ANESTHESIA): Performed by: OBSTETRICS & GYNECOLOGY

## 2021-03-15 PROCEDURE — 88305 TISSUE EXAM BY PATHOLOGIST: CPT

## 2021-03-15 PROCEDURE — 2500000003 HC RX 250 WO HCPCS: Performed by: REGISTERED NURSE

## 2021-03-15 PROCEDURE — 7100000010 HC PHASE II RECOVERY - FIRST 15 MIN: Performed by: OBSTETRICS & GYNECOLOGY

## 2021-03-15 RX ORDER — SCOLOPAMINE TRANSDERMAL SYSTEM 1 MG/1
PATCH, EXTENDED RELEASE TRANSDERMAL
Status: DISCONTINUED
Start: 2021-03-15 | End: 2021-03-15 | Stop reason: HOSPADM

## 2021-03-15 RX ORDER — ACETAMINOPHEN 325 MG/1
650 TABLET ORAL EVERY 4 HOURS PRN
Status: CANCELLED | OUTPATIENT
Start: 2021-03-15

## 2021-03-15 RX ORDER — PROMETHAZINE HYDROCHLORIDE 25 MG/1
12.5 TABLET ORAL EVERY 6 HOURS PRN
Status: CANCELLED | OUTPATIENT
Start: 2021-03-15

## 2021-03-15 RX ORDER — BUTALBITAL, ACETAMINOPHEN AND CAFFEINE 50; 325; 40 MG/1; MG/1; MG/1
TABLET ORAL
Qty: 90 TABLET | Refills: 0 | Status: SHIPPED | OUTPATIENT
Start: 2021-03-15 | End: 2021-04-20

## 2021-03-15 RX ORDER — FENTANYL CITRATE 50 UG/ML
INJECTION, SOLUTION INTRAMUSCULAR; INTRAVENOUS PRN
Status: DISCONTINUED | OUTPATIENT
Start: 2021-03-15 | End: 2021-03-15 | Stop reason: SDUPTHER

## 2021-03-15 RX ORDER — ACETAMINOPHEN 325 MG/1
650 TABLET ORAL EVERY 6 HOURS PRN
Qty: 120 TABLET | Refills: 3 | COMMUNITY
Start: 2021-03-15

## 2021-03-15 RX ORDER — KETOROLAC TROMETHAMINE 30 MG/ML
30 INJECTION, SOLUTION INTRAMUSCULAR; INTRAVENOUS EVERY 6 HOURS
Status: CANCELLED | OUTPATIENT
Start: 2021-03-15 | End: 2021-03-17

## 2021-03-15 RX ORDER — OXYCODONE HYDROCHLORIDE AND ACETAMINOPHEN 5; 325 MG/1; MG/1
1 TABLET ORAL EVERY 6 HOURS PRN
Qty: 28 TABLET | Refills: 0 | Status: SHIPPED | OUTPATIENT
Start: 2021-03-15 | End: 2021-03-22

## 2021-03-15 RX ORDER — SODIUM CHLORIDE 9 MG/ML
INJECTION, SOLUTION INTRAVENOUS CONTINUOUS PRN
Status: DISCONTINUED | OUTPATIENT
Start: 2021-03-15 | End: 2021-03-15 | Stop reason: SDUPTHER

## 2021-03-15 RX ORDER — LIDOCAINE HYDROCHLORIDE 20 MG/ML
INJECTION, SOLUTION EPIDURAL; INFILTRATION; INTRACAUDAL; PERINEURAL PRN
Status: DISCONTINUED | OUTPATIENT
Start: 2021-03-15 | End: 2021-03-15 | Stop reason: SDUPTHER

## 2021-03-15 RX ORDER — HYDROCODONE BITARTRATE AND ACETAMINOPHEN 5; 325 MG/1; MG/1
1 TABLET ORAL EVERY 4 HOURS PRN
Status: CANCELLED | OUTPATIENT
Start: 2021-03-15

## 2021-03-15 RX ORDER — SODIUM CHLORIDE, SODIUM LACTATE, POTASSIUM CHLORIDE, CALCIUM CHLORIDE 600; 310; 30; 20 MG/100ML; MG/100ML; MG/100ML; MG/100ML
INJECTION, SOLUTION INTRAVENOUS SEE ADMIN INSTRUCTIONS
Status: CANCELLED | OUTPATIENT
Start: 2021-03-15

## 2021-03-15 RX ORDER — ONDANSETRON 2 MG/ML
INJECTION INTRAMUSCULAR; INTRAVENOUS PRN
Status: DISCONTINUED | OUTPATIENT
Start: 2021-03-15 | End: 2021-03-15 | Stop reason: SDUPTHER

## 2021-03-15 RX ORDER — ONDANSETRON 2 MG/ML
8 INJECTION INTRAMUSCULAR; INTRAVENOUS EVERY 8 HOURS PRN
Status: DISCONTINUED | OUTPATIENT
Start: 2021-03-15 | End: 2021-03-15 | Stop reason: HOSPADM

## 2021-03-15 RX ORDER — ONDANSETRON 2 MG/ML
4 INJECTION INTRAMUSCULAR; INTRAVENOUS EVERY 6 HOURS PRN
Status: CANCELLED | OUTPATIENT
Start: 2021-03-15

## 2021-03-15 RX ORDER — FENTANYL CITRATE 50 UG/ML
25 INJECTION, SOLUTION INTRAMUSCULAR; INTRAVENOUS EVERY 5 MIN PRN
Status: DISCONTINUED | OUTPATIENT
Start: 2021-03-15 | End: 2021-03-15 | Stop reason: HOSPADM

## 2021-03-15 RX ORDER — IBUPROFEN 800 MG/1
400 TABLET ORAL EVERY 6 HOURS PRN
Status: CANCELLED | OUTPATIENT
Start: 2021-03-15

## 2021-03-15 RX ORDER — DEXAMETHASONE SODIUM PHOSPHATE 10 MG/ML
INJECTION, EMULSION INTRAMUSCULAR; INTRAVENOUS PRN
Status: DISCONTINUED | OUTPATIENT
Start: 2021-03-15 | End: 2021-03-15 | Stop reason: SDUPTHER

## 2021-03-15 RX ORDER — MIDAZOLAM HYDROCHLORIDE 1 MG/ML
INJECTION INTRAMUSCULAR; INTRAVENOUS PRN
Status: DISCONTINUED | OUTPATIENT
Start: 2021-03-15 | End: 2021-03-15 | Stop reason: SDUPTHER

## 2021-03-15 RX ORDER — ATORVASTATIN CALCIUM 20 MG/1
20 TABLET, FILM COATED ORAL DAILY
Qty: 90 TABLET | Refills: 1 | Status: SHIPPED | OUTPATIENT
Start: 2021-03-15 | End: 2021-09-12 | Stop reason: SDUPTHER

## 2021-03-15 RX ORDER — PROPOFOL 10 MG/ML
INJECTION, EMULSION INTRAVENOUS PRN
Status: DISCONTINUED | OUTPATIENT
Start: 2021-03-15 | End: 2021-03-15 | Stop reason: SDUPTHER

## 2021-03-15 RX ORDER — FENTANYL CITRATE 50 UG/ML
50 INJECTION, SOLUTION INTRAMUSCULAR; INTRAVENOUS EVERY 5 MIN PRN
Status: DISCONTINUED | OUTPATIENT
Start: 2021-03-15 | End: 2021-03-15 | Stop reason: HOSPADM

## 2021-03-15 RX ORDER — PROMETHAZINE HYDROCHLORIDE 25 MG/ML
12.5 INJECTION, SOLUTION INTRAMUSCULAR; INTRAVENOUS
Status: DISCONTINUED | OUTPATIENT
Start: 2021-03-15 | End: 2021-03-15 | Stop reason: HOSPADM

## 2021-03-15 RX ORDER — SCOLOPAMINE TRANSDERMAL SYSTEM 1 MG/1
1 PATCH, EXTENDED RELEASE TRANSDERMAL ONCE
Status: DISCONTINUED | OUTPATIENT
Start: 2021-03-15 | End: 2021-03-15 | Stop reason: HOSPADM

## 2021-03-15 RX ORDER — MORPHINE SULFATE 2 MG/ML
2 INJECTION, SOLUTION INTRAMUSCULAR; INTRAVENOUS
Status: CANCELLED | OUTPATIENT
Start: 2021-03-15

## 2021-03-15 RX ORDER — OXYCODONE HYDROCHLORIDE AND ACETAMINOPHEN 5; 325 MG/1; MG/1
1 TABLET ORAL ONCE
Status: COMPLETED | OUTPATIENT
Start: 2021-03-15 | End: 2021-03-15

## 2021-03-15 RX ORDER — KETOROLAC TROMETHAMINE 30 MG/ML
INJECTION, SOLUTION INTRAMUSCULAR; INTRAVENOUS PRN
Status: DISCONTINUED | OUTPATIENT
Start: 2021-03-15 | End: 2021-03-15 | Stop reason: SDUPTHER

## 2021-03-15 RX ORDER — IBUPROFEN 600 MG/1
600 TABLET ORAL EVERY 6 HOURS PRN
Qty: 30 TABLET | Refills: 1 | COMMUNITY
Start: 2021-03-15 | End: 2022-05-05

## 2021-03-15 RX ORDER — MORPHINE SULFATE 2 MG/ML
4 INJECTION, SOLUTION INTRAMUSCULAR; INTRAVENOUS
Status: CANCELLED | OUTPATIENT
Start: 2021-03-15

## 2021-03-15 RX ORDER — OXYCODONE HYDROCHLORIDE AND ACETAMINOPHEN 5; 325 MG/1; MG/1
TABLET ORAL
Status: COMPLETED
Start: 2021-03-15 | End: 2021-03-15

## 2021-03-15 RX ORDER — HYDROCODONE BITARTRATE AND ACETAMINOPHEN 5; 325 MG/1; MG/1
2 TABLET ORAL EVERY 4 HOURS PRN
Status: CANCELLED | OUTPATIENT
Start: 2021-03-15

## 2021-03-15 RX ORDER — LABETALOL 20 MG/4 ML (5 MG/ML) INTRAVENOUS SYRINGE
10 EVERY 10 MIN PRN
Status: DISCONTINUED | OUTPATIENT
Start: 2021-03-15 | End: 2021-03-15 | Stop reason: HOSPADM

## 2021-03-15 RX ORDER — SODIUM CHLORIDE, SODIUM LACTATE, POTASSIUM CHLORIDE, CALCIUM CHLORIDE 600; 310; 30; 20 MG/100ML; MG/100ML; MG/100ML; MG/100ML
INJECTION, SOLUTION INTRAVENOUS CONTINUOUS
Status: DISCONTINUED | OUTPATIENT
Start: 2021-03-15 | End: 2021-03-15 | Stop reason: HOSPADM

## 2021-03-15 RX ADMIN — SODIUM CHLORIDE: 9 INJECTION, SOLUTION INTRAVENOUS at 12:27

## 2021-03-15 RX ADMIN — PROPOFOL 200 MG: 10 INJECTION, EMULSION INTRAVENOUS at 12:31

## 2021-03-15 RX ADMIN — MIDAZOLAM HYDROCHLORIDE 2 MG: 1 INJECTION, SOLUTION INTRAMUSCULAR; INTRAVENOUS at 12:31

## 2021-03-15 RX ADMIN — LIDOCAINE HYDROCHLORIDE 100 MG: 20 INJECTION, SOLUTION EPIDURAL; INFILTRATION; INTRACAUDAL; PERINEURAL at 12:31

## 2021-03-15 RX ADMIN — DEXAMETHASONE SODIUM PHOSPHATE 10 MG: 10 INJECTION, EMULSION INTRAMUSCULAR; INTRAVENOUS at 12:31

## 2021-03-15 RX ADMIN — ONDANSETRON HYDROCHLORIDE 4 MG: 4 INJECTION, SOLUTION INTRAMUSCULAR; INTRAVENOUS at 12:31

## 2021-03-15 RX ADMIN — KETOROLAC TROMETHAMINE 30 MG: 30 INJECTION, SOLUTION INTRAMUSCULAR at 12:59

## 2021-03-15 RX ADMIN — FENTANYL CITRATE 100 MCG: 50 INJECTION, SOLUTION INTRAMUSCULAR; INTRAVENOUS at 12:31

## 2021-03-15 RX ADMIN — OXYCODONE HYDROCHLORIDE AND ACETAMINOPHEN 1 TABLET: 5; 325 TABLET ORAL at 14:05

## 2021-03-15 ASSESSMENT — PULMONARY FUNCTION TESTS
PIF_VALUE: 2
PIF_VALUE: 20
PIF_VALUE: 19
PIF_VALUE: 14
PIF_VALUE: 3
PIF_VALUE: 17
PIF_VALUE: 18
PIF_VALUE: 20
PIF_VALUE: 2
PIF_VALUE: 15
PIF_VALUE: 3
PIF_VALUE: 3
PIF_VALUE: 17
PIF_VALUE: 2
PIF_VALUE: 3
PIF_VALUE: 2
PIF_VALUE: 21
PIF_VALUE: 3
PIF_VALUE: 12
PIF_VALUE: 17

## 2021-03-15 ASSESSMENT — PAIN - FUNCTIONAL ASSESSMENT: PAIN_FUNCTIONAL_ASSESSMENT: 0-10

## 2021-03-15 NOTE — ANESTHESIA POSTPROCEDURE EVALUATION
Department of Anesthesiology  Postprocedure Note    Patient: Jonathan Hardwick  MRN: [id number]  YOB: 1975  Date of evaluation: 3/15/2021  Time:  1:55 PM     Procedure Summary     Date: 03/15/21 Room / Location: [address] [place] [address] 01 / [address] [facility]    Anesthesia Start: 5959 Anesthesia Stop: 8324    Procedure: DILATATION AND CURETTAGE HYSTEROSCOPY  MYOSURE ENDOMETRIAL ABLATION (N/A Uterus) Diagnosis: (MENORRHAGIA)    Surgeons: Ana Cartagena MD Responsible Provider: Deshawn Lei DO    Anesthesia Type: general ASA Status: 2          Anesthesia Type: general    Rosa Phase I: Rosa Score: 10    Rosa Phase II:      Last vitals: Reviewed and per EMR flowsheets.        Anesthesia Post Evaluation    Patient location during evaluation: floor  Patient participation: complete - patient participated  Level of consciousness: awake  Airway patency: patent  Nausea & Vomiting: no vomiting and no nausea  Cardiovascular status: hemodynamically stable  Respiratory status: acceptable  Hydration status: stable

## 2021-03-15 NOTE — OP NOTE
Gyn Service    Operative Report      Pt Name: Abida Boateng  MRN: [id number] [place] #: [de-identified]  YOB: 1975  Procedure Performed By: Leslie Cotton MD        Pre-operative Diagnosis:  Menorrhagia, failed medical management    Post-operative Diagnosis:  Menorrhagia, failed medical management     Procedure:  [facility], ENDOMETRIAL ABLATION    Surgeon:  Leslie Cotton MD    Anesthesia:  GENERAL     Estimated blood loss:   25 cc    Fluids: 400 cc LR    Urine: none during the procedure; drained with prep    Findings:  Normal external genitalia. Small, mobile retroverted uterus. No adnexal masses were palpated. Normal endometrial cavity. Bilateral tubal ostia. Complications:  NONE    Pathology:  Endometrial curettings    Indications: The patient is a 39 y.o. [id number] female  who presents today for hysteroscopy D&C and endometrial ablation for menorrhagia. She presented to the office for her annual exam c/o very heavy period. She would often have to leave work and felt faint/weak when on her menses. She underwent a trial of OCPs without any improvement in symptoms. She elected to proceed with surgical management. The decision was made to proceed with a hysteroscopy, dilation and curettage and endometrial ablation. All risks, benefits and alternatives to the procedure were discussed in detail including but not limited to bleeding, infection, and uterine perforation. All questions were answered and the consent was signed. Procedure: The patient was taken to the operating room where general anesthesia was initiated. She was placed in the dorsal lithotomy position and prepped and draped in the normal sterile fashion. A bimanual exam was performed noting a small retroverted uterus. A weighted speculum was placed in the vagina and a Kali retractor was used to visualize the cervix. The anterior lip of the cervix was grasped with a single toothed tenaculum.  The uterus sounded to 9.5 cm. The cervix was gently dilated with Iam's dilators. The hysteroscope was gently introduced into the endocervical canal. Bilateral tubal ostia were observed. The cavity was smooth. No masses or lesions were noted. The cervix measured 4 cm. The hysteroscope was removed. A sharp curettage was performed. A small amount of endometrial tissue was noted. The curettings were sent to pathology. The cervix was further dilated. The Nelly was gently introduced into the endocervical canal. The cavity length was set at 4.5. The cavity assessment was completed. Then the device was activated for the ablation. The Nelly device was removed from the cavity. Good maryse marks were noted on the device. The hysteroscope was gently reinserted into the cavity, noting good maryse marks on the cavity. The hysteroscope was then removed from the cavity. The single toothed tenaculum was removed from the cervix. Good hemostasis was noted. All instruments were removed from the vagina. All sponge, and instrument counts were correct. The patient tolerated the procedure well and was taken back to the recovery room in stable condition.      Miya Bear  1:12 PM  3/15/2021

## 2021-03-15 NOTE — ANESTHESIA PRE PROCEDURE
Department of Anesthesiology  Preprocedure Note       Name:  Deborah Akers   Age:  39 y.o.  :  1975                                          MRN:  [id number]         Date:  3/15/2021      Surgeon: Lianna Thomson):  Mabel Kehr, MD    Procedure: Procedure(s):  DILATATION AND CURETTAGE HYSTEROSCOPY  MYOSURE ENDOMETRIAL ABLATION    Medications prior to admission:   Prior to Admission medications    Medication Sig Start Date End Date Taking?  Authorizing Provider   butalbital-acetaminophen-caffeine (FIORICET, ESGIC) -40 MG per tablet TAKE 1 TO 2 TABLETS BY MOUTH EVERY 6 HOURS AS NEEDED FOR HEADACHE 3/15/21  Yes Andreea Reeves MD   montelukast (SINGULAIR) 10 MG tablet Take 1 tablet by mouth daily 21  Yes BERE Vergara CNP   albuterol sulfate HFA (PROAIR HFA) 108 (90 Base) MCG/ACT inhaler Inhale 2 puffs into the lungs every 6 hours as needed for Wheezing 20  Yes BERE Vergara CNP   diclofenac sodium (VOLTAREN) 1 % GEL Apply 4 g topically 4 times daily as needed for Pain 20  Yes BERE Vergara CNP   buPROPion (WELLBUTRIN XL) 150 MG extended release tablet Take 1 tablet by mouth every morning 20  Yes Andreea Reeves MD   escitalopram (LEXAPRO) 20 MG tablet Take 1 tablet by mouth daily 4/10/20  Yes BERE Vergara CNP   albuterol (PROVENTIL) (2.5 MG/3ML) 0.083% nebulizer solution Take 3 mLs by nebulization every 6 hours as needed for Wheezing 3/18/20  Yes Andreea Reeves MD   diclofenac sodium 1 % GEL Apply 4 g topically 4 times daily 18  Yes BERE Vergara CNP   dicyclomine (BENTYL) 20 MG tablet TAKE 1 TABLET BY MOUTH THREE TIMES DAILY AS NEEDED FOR ABDOMINAL CRAMPS 10/26/15  Yes Andreea Reeves MD   sucralfate (CARAFATE) 1 GM tablet Take 1 g by mouth 2 times daily  4/1/15  Yes Historical Provider, MD   dexlansoprazole (DEXILANT) 60 MG CPDR capsule Take 60 mg by mouth daily   Yes Historical Provider, MD   zafirlukast (ACCOLATE) 20 MG tablet Take 1 tablet by mouth twice daily 12/23/20   Betsy Cervantes MD   cyclobenzaprine (FLEXERIL) 10 MG tablet Take 1 tablet by mouth every 8 hours as needed for Muscle spasms 12/22/20   BERE Mckinley - CNP   atorvastatin (LIPITOR) 20 MG tablet Take 1 tablet by mouth daily 12/9/20   Betsy Cervantes MD       Current medications:    Current Facility-Administered Medications   Medication Dose Route Frequency Provider Last Rate Last Admin    lactated ringers infusion   Intravenous Continuous Bernard Moore MD        ondansetron [facility] injection 8 mg  8 mg Intravenous Q8H PRN Bernard Moore MD        scopolamine (TRANSDERM-SCOP) transdermal patch 1 patch  1 patch Transdermal Once Aristeo Parnell DO        scopolamine (TRANSDERM-SCOP) 1 MG/3DAYS transdermal patch                Allergies:  No Known Allergies    Problem List:    Patient Active Problem List   Diagnosis Code    Asthma J45.909    Depression F32.9    S/P laparoscopic cholecystectomy Z90.49    Skin rash R21    Abdominal trauma S39. 91XA    Diverticulitis of large intestine K57.32       Past Medical History:        Diagnosis Date    Anxiety     Arthritis     Asthma     Depression     Diverticulitis     GERD (gastroesophageal reflux disease)     Hiatal hernia     Hyperlipidemia     Migraines     Obesity        Past Surgical History:        Procedure Laterality Date    CHOLECYSTECTOMY, LAPAROSCOPIC  3/4/13    Dr. Mynor Barreto    right    [address] [facility]    exploratory-Dr. Edison Elise  2004    Dr. Eric Millan       Social History:    Social History     Tobacco Use    Smoking status: Never Smoker    Smokeless tobacco: Never Used   Substance Use Topics    Alcohol use: No     Alcohol/week: 0.0 standard drinks     Comment: rarely                                Counseling given: Not Answered      Vital Signs (Current):   Vitals:    03/09/21 1402 03/15/21 1208   BP:  134/67   Pulse:  100 Resp:  16   Temp:  97.6 °F (36.4 °C)   TempSrc:  Temporal   SpO2:  96%   Weight: 228 lb (103.4 kg) 231 lb (104.8 kg)   Height: 5' 6\" (1.676 m) 5' 6\" (1.676 m)                                              BP Readings from Last 3 Encounters:   03/15/21 134/67   12/22/20 112/76   03/08/20 133/73       NPO Status: Time of last liquid consumption: 2300                        Time of last solid consumption: 1900                        Date of last liquid consumption: 03/14/21                        Date of last solid food consumption: 03/14/21    BMI:   Wt Readings from Last 3 Encounters:   03/15/21 231 lb (104.8 kg)   12/22/20 227 lb 12.8 oz (103.3 kg)   03/08/20 213 lb (96.6 kg)     Body mass index is 37.28 kg/m². CBC:   Lab Results   Component Value Date    WBC 9.7 03/08/2021    RBC 4.74 03/08/2021    RBC 4.81 08/13/2019    HGB 13.2 03/08/2021    HCT 42.2 03/08/2021    MCV 89.0 03/08/2021    RDW 14.1 08/13/2019     03/08/2021       CMP:   Lab Results   Component Value Date     08/13/2019    K 4.8 08/13/2019     08/13/2019    CO2 22 08/13/2019    BUN 7 08/13/2019    CREATININE 0.75 08/13/2019    LABGLOM >90 04/29/2016    GLUCOSE 91 08/13/2019    PROT 6.8 08/13/2019    CALCIUM 9.2 08/13/2019    BILITOT 0.3 08/13/2019    BILITOT NEGATIVE 02/20/2019    ALKPHOS 148 08/13/2019    ALKPHOS 99 04/29/2016    AST 13 08/13/2019    ALT 17 08/13/2019       POC Tests: No results for input(s): POCGLU, POCNA, POCK, POCCL, POCBUN, POCHEMO, POCHCT in the last 72 hours.     Coags: No results found for: PROTIME, INR, APTT    HCG (If Applicable):   Lab Results   Component Value Date    PREGTESTUR negative 03/15/2021    PREGSERUM NEGATIVE 07/13/2011        ABGs: No results found for: PHART, PO2ART, HYS0GTW, XFY2DWH, BEART, N6WCKOSE     Type & Screen (If Applicable):  No results found for: LABABO, LABRH    Drug/Infectious Status (If Applicable):  No results found for: HIV, HEPCAB    COVID-19 Screening (If Applicable): Lab Results   Component Value Date    COVID19 Not Detected 03/08/2021           Anesthesia Evaluation  Patient summary reviewed  Airway: Mallampati: II  TM distance: >3 FB   Neck ROM: full  Mouth opening: > = 3 FB Dental:          Pulmonary:   (+) asthma:                            Cardiovascular:                      Neuro/Psych:   (+) headaches:, psychiatric history:            GI/Hepatic/Renal:   (+) hiatal hernia, GERD:,           Endo/Other:                     Abdominal:           Vascular:                                        Anesthesia Plan      general     ASA 2       Induction: intravenous. MIPS: Postoperative opioids intended and Prophylactic antiemetics administered. Anesthetic plan and risks discussed with patient. Plan discussed with CRNA. Melany Vaz.  Carmina March DO   3/15/2021

## 2021-03-23 RX ORDER — MONTELUKAST SODIUM 10 MG/1
TABLET ORAL
Qty: 90 TABLET | Refills: 2 | Status: SHIPPED | OUTPATIENT
Start: 2021-03-23 | End: 2021-12-04 | Stop reason: SDUPTHER

## 2021-03-29 RX ORDER — BUPROPION HYDROCHLORIDE 150 MG/1
150 TABLET ORAL EVERY MORNING
Qty: 90 TABLET | Refills: 1 | Status: SHIPPED | OUTPATIENT
Start: 2021-03-29 | End: 2021-10-07 | Stop reason: SDUPTHER

## 2021-04-11 DIAGNOSIS — F32.A DEPRESSION, UNSPECIFIED DEPRESSION TYPE: ICD-10-CM

## 2021-04-12 RX ORDER — ESCITALOPRAM OXALATE 20 MG/1
20 TABLET ORAL DAILY
Qty: 30 TABLET | Refills: 5 | Status: SHIPPED | OUTPATIENT
Start: 2021-04-12 | End: 2021-10-07 | Stop reason: SDUPTHER

## 2021-04-20 RX ORDER — BUTALBITAL, ACETAMINOPHEN AND CAFFEINE 50; 325; 40 MG/1; MG/1; MG/1
TABLET ORAL
Qty: 90 TABLET | Refills: 0 | Status: SHIPPED | OUTPATIENT
Start: 2021-04-20 | End: 2021-05-17

## 2021-05-10 ENCOUNTER — NURSE TRIAGE (OUTPATIENT)
Dept: OTHER | Facility: CLINIC | Age: 46
End: 2021-05-10

## 2021-05-10 ENCOUNTER — VIRTUAL VISIT (OUTPATIENT)
Dept: FAMILY MEDICINE CLINIC | Age: 46
End: 2021-05-10
Payer: COMMERCIAL

## 2021-05-10 DIAGNOSIS — K52.9 ACUTE GASTROENTERITIS: Primary | ICD-10-CM

## 2021-05-10 PROCEDURE — G8427 DOCREV CUR MEDS BY ELIG CLIN: HCPCS | Performed by: FAMILY MEDICINE

## 2021-05-10 PROCEDURE — 99213 OFFICE O/P EST LOW 20 MIN: CPT | Performed by: FAMILY MEDICINE

## 2021-05-10 ASSESSMENT — ENCOUNTER SYMPTOMS
COUGH: 0
EYES NEGATIVE: 1
CHEST TIGHTNESS: 0
SORE THROAT: 0
DIARRHEA: 1
RHINORRHEA: 0
ABDOMINAL PAIN: 0
VOMITING: 1
BACK PAIN: 0
SHORTNESS OF BREATH: 0
NAUSEA: 1

## 2021-05-10 NOTE — LETTER
[address]  [address]  [facility] [address]  Phone: [phone]  Fax: [phone]    Zoey Tripathi MD        May 10, 2021     Patient: Salina Nascimento   YOB: 1975   Date of Visit: 5/10/2021       To Whom It May Concern: It is my medical opinion that Nishant Del Toro may return to work on 5/12/21. If you have any questions or concerns, please don't hesitate to call.     Sincerely,        Zoey Tripathi MD

## 2021-05-10 NOTE — TELEPHONE ENCOUNTER
Received call from Criselda Smith at [facility]) [address] [facility] with [employer] Complaint. Brief description of triage: mid-abdominal pain x 2 days, reports vomiting, diarrhea over the past 2 days. Patient states she is able to sip clear fluid and has eaten some crackers. Pt denies fever. Patient states patient is relieved with vomiting or diarrhea episode. Triage indicates for patient to be seen today. Care advice provided, patient verbalizes understanding; denies any other questions or concerns; instructed to call back for any new or worsening symptoms. Writer provided warm transfer to [place] at [facility] for appointment scheduling. Attention Provider: Thank you for allowing me to participate in the care of your patient. The patient was connected to triage in response to information provided to the [facility]. Please do not respond through this encounter as the response is not directed to a shared pool. Reason for Disposition   MODERATE OR MILD pain that comes and goes (cramps) lasts > 24 hours    Answer Assessment - Initial Assessment Questions  1. LOCATION: \"Where does it hurt? \"       Generalized mid abdominal pain     2. RADIATION: \"Does the pain shoot anywhere else? \" (e.g., chest, back)      No     3. ONSET: \"When did the pain begin? \" (e.g., minutes, hours or days ago)       2 days ago    4. SUDDEN: \"Gradual or sudden onset? \"    Hurts when patient needs to vomit or have diarrhea     5. PATTERN \"Does the pain come and go, or is it constant? \"     - If constant: \"Is it getting better, staying the same, or worsening? \"       (Note: Constant means the pain never goes away completely; most serious pain is constant and it progresses)      - If intermittent: \"How long does it last?\" \"Do you have pain now? \"      (Note: Intermittent means the pain goes away completely between bouts)      Intermittent     6. SEVERITY: \"How bad is the pain? \"  (e.g., Scale 1-10; mild, moderate, or severe)    - MILD (1-3): doesn't

## 2021-05-10 NOTE — PROGRESS NOTES
MG tablet Take 1 tablet by mouth twice daily  Ce Richards MD   cyclobenzaprine (FLEXERIL) 10 MG tablet Take 1 tablet by mouth every 8 hours as needed for Muscle spasms  BERE Harrison CNP   albuterol sulfate HFA (PROAIR HFA) 108 (90 Base) MCG/ACT inhaler Inhale 2 puffs into the lungs every 6 hours as needed for Wheezing  BERE Harrison CNP   diclofenac sodium (VOLTAREN) 1 % GEL Apply 4 g topically 4 times daily as needed for Pain  BERE Harrison CNP   albuterol (PROVENTIL) (2.5 MG/3ML) 0.083% nebulizer solution Take 3 mLs by nebulization every 6 hours as needed for Wheezing  Ce Richards MD   diclofenac sodium 1 % GEL Apply 4 g topically 4 times daily  BERE Harrison CNP   dicyclomine (BENTYL) 20 MG tablet TAKE 1 TABLET BY MOUTH THREE TIMES DAILY AS NEEDED FOR ABDOMINAL CRAMPS  Ce Richards MD   sucralfate (CARAFATE) 1 GM tablet Take 1 g by mouth 2 times daily   Historical Provider, MD   dexlansoprazole (DEXILANT) 60 MG CPDR capsule Take 60 mg by mouth daily  Historical Provider, MD       Social History     Tobacco Use    Smoking status: Never Smoker    Smokeless tobacco: Never Used   Substance Use Topics    Alcohol use: No     Alcohol/week: 0.0 standard drinks     Comment: rarely    Drug use: No        No Known Allergies,   Past Medical History:   Diagnosis Date    Anxiety     Arthritis     Asthma     Depression     Diverticulitis     GERD (gastroesophageal reflux disease)     Hiatal hernia     Hyperlipidemia     Migraines     Obesity    ,   Past Surgical History:   Procedure Laterality Date    CHOLECYSTECTOMY, LAPAROSCOPIC  3/4/13    Dr. Indio Cotton N/A 3/15/2021    DILATATION AND CURETTAGE HYSTEROSCOPY  [facility] performed by Wilfredo Escobar MD at [place] [id number]    right    [address] [place] [address]    exploratory-Dr. Myah Berkowitz  2004    Dr. La   , Social History     Tobacco Use    Smoking status: Never Smoker    Smokeless tobacco: Never Used   Substance Use Topics    Alcohol use: No     Alcohol/week: 0.0 standard drinks     Comment: rarely    Drug use: No   ,   Family History   Problem Relation Age of Onset    Heart Disease Father     COPD Father     Diabetes Sister     COPD Paternal Grandfather     Emphysema Paternal Grandfather     Asthma Paternal Grandfather     Diabetes Mother     High Cholesterol Mother     High Blood Pressure Maternal Grandmother     Glaucoma Maternal Grandmother     Stroke Maternal Grandfather     High Cholesterol Paternal Grandmother     Other Paternal Grandmother         begnin brain tumor   ,   Immunization History   Administered Date(s) Administered    Influenza Vaccine, unspecified formulation 11/14/2016, 10/05/2018    Influenza Virus Vaccine 10/11/2012, 10/21/2013, 10/21/2014, 09/18/2015, 10/04/2019, 10/03/2020    Pneumococcal Polysaccharide (Aezcuzinz17) 10/31/2014    Tdap (Boostrix, Adacel) 10/31/2014   ,   Health Maintenance   Topic Date Due    Hepatitis C screen  Never done    HIV screen  Never done    COVID-19 Vaccine (1) Never done    Cervical cancer screen  12/01/2017    Lipid screen  06/10/2020    DTaP/Tdap/Td vaccine (2 - Td) 10/31/2024    Flu vaccine  Completed    Pneumococcal 0-64 years Vaccine  Completed    Hepatitis A vaccine  Aged Out    Hepatitis B vaccine  Aged Out    Hib vaccine  Aged Out    Meningococcal (ACWY) vaccine  Aged Out       PHYSICAL EXAMINATION:  [ INSTRUCTIONS:  \"[x]\" Indicates a positive item  \"[]\" Indicates a negative item  -- DELETE ALL ITEMS NOT EXAMINED]  Vital Signs: (As obtained by patient/caregiver or practitioner observation)    Blood pressure-  Heart rate-    Respiratory rate-    Temperature-  Pulse oximetry-     Constitutional: [x] Appears well-developed and well-nourished [x] No apparent distress      [] Abnormal-   Mental status  [x] Alert and awake  [x] MD on 5/10/2021 at 12:06 PM    An electronic signature was used to authenticate this note.

## 2021-05-11 ENCOUNTER — TELEPHONE (OUTPATIENT)
Dept: FAMILY MEDICINE CLINIC | Age: 46
End: 2021-05-11

## 2021-05-11 DIAGNOSIS — R19.7 DIARRHEA, UNSPECIFIED TYPE: Primary | ICD-10-CM

## 2021-05-11 RX ORDER — DIPHENOXYLATE HYDROCHLORIDE AND ATROPINE SULFATE 2.5; .025 MG/1; MG/1
1 TABLET ORAL 4 TIMES DAILY PRN
Qty: 40 TABLET | Refills: 0 | Status: SHIPPED | OUTPATIENT
Start: 2021-05-11 | End: 2021-05-21

## 2021-05-11 NOTE — TELEPHONE ENCOUNTER
Having loose stool that is watery and foul smelling every 20 minutes. Feels she is getting dehydrated. Asking for med to stop diarrhea. Also asking for note to be off tomorrow, if ok, email to her.    email -[email or web address]    Chitra BENITEZ

## 2021-05-17 RX ORDER — BUTALBITAL, ACETAMINOPHEN AND CAFFEINE 50; 325; 40 MG/1; MG/1; MG/1
TABLET ORAL
Qty: 90 TABLET | Refills: 0 | Status: SHIPPED | OUTPATIENT
Start: 2021-05-17 | End: 2021-06-23 | Stop reason: SDUPTHER

## 2021-06-06 LAB
ALBUMIN SERPL-MCNC: 4.2 G/DL (ref 3.2–5.3)
ALK PHOSPHATASE: 130 U/L (ref 39–130)
ALT SERPL-CCNC: 26 U/L (ref 0–31)
ANION GAP SERPL CALCULATED.3IONS-SCNC: 10 MMOL/L (ref 5–15)
AST SERPL-CCNC: 23 U/L (ref 0–41)
BILIRUB SERPL-MCNC: 0.3 MG/DL (ref 0.3–1.2)
BUN BLDV-MCNC: 11 MG/DL (ref 5–23)
CALCIUM SERPL-MCNC: 9.1 MG/DL (ref 8.5–10.5)
CHLORIDE BLD-SCNC: 104 MMOL/L (ref 98–109)
CHOLESTEROL/HDL RATIO: 3.6 (ref 1–5)
CHOLESTEROL: 192 MG/DL (ref 150–200)
CO2: 26 MMOL/L (ref 22–32)
CREAT SERPL-MCNC: 0.68 MG/DL (ref 0.4–1)
EGFR AFRICAN AMERICAN: >60 ML/MIN/1.73SQ.M
EGFR IF NONAFRICAN AMERICAN: >60 ML/MIN/1.73SQ.M
GLUCOSE: 85 MG/DL (ref 65–99)
HDLC SERPL-MCNC: 54 MG/DL
LDL CHOLESTEROL CALCULATED: 123 MG/DL
LDL/HDL RATIO: 2.3
POTASSIUM SERPL-SCNC: 3.9 MMOL/L (ref 3.5–5)
SODIUM BLD-SCNC: 140 MMOL/L (ref 134–146)
T4 FREE: 0.75 NG/DL (ref 0.61–1.6)
TOTAL PROTEIN: 7.5 G/DL (ref 6–8)
TRIGL SERPL-MCNC: 73 MG/DL (ref 27–150)
TSH SERPL DL<=0.05 MIU/L-ACNC: 1.95 UIU/ML (ref 0.49–4.67)
VLDLC SERPL CALC-MCNC: 15 MG/DL (ref 0–30)

## 2021-06-23 RX ORDER — BUTALBITAL, ACETAMINOPHEN AND CAFFEINE 50; 325; 40 MG/1; MG/1; MG/1
TABLET ORAL
Qty: 90 TABLET | Refills: 0 | Status: SHIPPED | OUTPATIENT
Start: 2021-06-23 | End: 2021-07-21 | Stop reason: SDUPTHER

## 2021-07-21 ENCOUNTER — OFFICE VISIT (OUTPATIENT)
Dept: FAMILY MEDICINE CLINIC | Age: 46
End: 2021-07-21
Payer: COMMERCIAL

## 2021-07-21 VITALS
WEIGHT: 227 LBS | SYSTOLIC BLOOD PRESSURE: 116 MMHG | DIASTOLIC BLOOD PRESSURE: 76 MMHG | TEMPERATURE: 98.9 F | BODY MASS INDEX: 34.4 KG/M2 | HEIGHT: 68 IN | RESPIRATION RATE: 20 BRPM | HEART RATE: 72 BPM

## 2021-07-21 DIAGNOSIS — E78.2 MIXED HYPERLIPIDEMIA: ICD-10-CM

## 2021-07-21 DIAGNOSIS — Z86.69 HISTORY OF TENSION HEADACHE: ICD-10-CM

## 2021-07-21 DIAGNOSIS — N39.3 STRESS INCONTINENCE: ICD-10-CM

## 2021-07-21 DIAGNOSIS — J45.20 MILD INTERMITTENT ASTHMA, UNSPECIFIED WHETHER COMPLICATED: ICD-10-CM

## 2021-07-21 DIAGNOSIS — F32.A DEPRESSION, UNSPECIFIED DEPRESSION TYPE: ICD-10-CM

## 2021-07-21 DIAGNOSIS — K58.0 IRRITABLE BOWEL SYNDROME WITH DIARRHEA: ICD-10-CM

## 2021-07-21 DIAGNOSIS — N39.41 URGE INCONTINENCE: ICD-10-CM

## 2021-07-21 DIAGNOSIS — G47.9 SLEEP DISTURBANCE: ICD-10-CM

## 2021-07-21 DIAGNOSIS — N32.81 OAB (OVERACTIVE BLADDER): ICD-10-CM

## 2021-07-21 DIAGNOSIS — R35.0 URINARY FREQUENCY: Primary | ICD-10-CM

## 2021-07-21 DIAGNOSIS — K21.9 GASTROESOPHAGEAL REFLUX DISEASE, UNSPECIFIED WHETHER ESOPHAGITIS PRESENT: ICD-10-CM

## 2021-07-21 LAB
BILIRUBIN, POC: NORMAL
BLOOD URINE, POC: NORMAL
CLARITY, POC: CLEAR
COLOR, POC: YELLOW
GLUCOSE URINE, POC: NORMAL
KETONES, POC: NORMAL
LEUKOCYTE EST, POC: NORMAL
NITRITE, POC: NORMAL
PH, POC: 6
PROTEIN, POC: NORMAL
SPECIFIC GRAVITY, POC: 1.01
UROBILINOGEN, POC: 0.2

## 2021-07-21 PROCEDURE — 81003 URINALYSIS AUTO W/O SCOPE: CPT | Performed by: NURSE PRACTITIONER

## 2021-07-21 PROCEDURE — G8417 CALC BMI ABV UP PARAM F/U: HCPCS | Performed by: NURSE PRACTITIONER

## 2021-07-21 PROCEDURE — G8427 DOCREV CUR MEDS BY ELIG CLIN: HCPCS | Performed by: NURSE PRACTITIONER

## 2021-07-21 PROCEDURE — 99214 OFFICE O/P EST MOD 30 MIN: CPT | Performed by: NURSE PRACTITIONER

## 2021-07-21 PROCEDURE — 1036F TOBACCO NON-USER: CPT | Performed by: NURSE PRACTITIONER

## 2021-07-21 RX ORDER — BUTALBITAL, ACETAMINOPHEN AND CAFFEINE 50; 325; 40 MG/1; MG/1; MG/1
TABLET ORAL
Qty: 90 TABLET | Refills: 0 | Status: SHIPPED | OUTPATIENT
Start: 2021-07-21 | End: 2021-08-26 | Stop reason: SDUPTHER

## 2021-07-21 RX ORDER — HYDROXYZINE HYDROCHLORIDE 25 MG/1
25 TABLET, FILM COATED ORAL NIGHTLY PRN
Qty: 30 TABLET | Refills: 1 | Status: SHIPPED | OUTPATIENT
Start: 2021-07-21 | End: 2021-08-20

## 2021-07-26 ASSESSMENT — ENCOUNTER SYMPTOMS
COUGH: 0
SHORTNESS OF BREATH: 0
WHEEZING: 0
ABDOMINAL DISTENTION: 0
BACK PAIN: 0
ABDOMINAL PAIN: 0
CHEST TIGHTNESS: 0

## 2021-07-26 NOTE — PROGRESS NOTES
[address] [facility]  [address] [facility] [place] [address]  Dept: [phone]  Dept Fax: [phone]  Loc: [phone]  PROGRESS NOTE      VisitDate: 7/21/2021    Anand Olivo is a 42 y.o. female who presents today for:     Chief Complaint   Patient presents with    Urinary Frequency     Feels the need to go all the time, no pain with urination. Started about one week ago. She has some abdominal pain, but she typically has that with her GI issues. Subjective:  Patient presents with overactive bladder symptoms, urinary frequency. Denies any dysuria, hematuria or flank pain. Patient also complains of difficulty sleeping. Has taken melatonin with minimal results. ,  History of anxiety/depression, hyperlipidemia, migraines and IBS. Patient reports that she does not feel like she empties her bladder completely and will need to again urinate shortly after initial urination. Patient also complains of some mild stress and urge incontinence. Patient also complains of intermittent tension headaches. History anxiety/depression, asthma, GERD  Review of Systems   Constitutional: Negative for activity change, appetite change, chills, fatigue and fever. Eyes: Negative for visual disturbance. Respiratory: Negative for cough, chest tightness, shortness of breath and wheezing. Cardiovascular: Negative for chest pain, palpitations and leg swelling. Gastrointestinal: Negative for abdominal distention and abdominal pain. Genitourinary: Positive for frequency and urgency. Negative for dysuria and flank pain. Musculoskeletal: Negative for arthralgias, back pain and neck pain. Skin: Negative. Negative for rash. Neurological: Positive for headaches. Negative for dizziness and light-headedness. Hematological: Negative for adenopathy. Psychiatric/Behavioral: Positive for sleep disturbance.  Negative for decreased concentration and dysphoric mood.   All other systems reviewed and are negative.     Past Medical History:   Diagnosis Date    Anxiety     Arthritis     Asthma     Depression     Diverticulitis     GERD (gastroesophageal reflux disease)     Hiatal hernia     Hyperlipidemia     Migraines     Obesity       Past Surgical History:   Procedure Laterality Date    CHOLECYSTECTOMY, LAPAROSCOPIC  3/4/13    Dr. Tarik Thorpe N/A 3/15/2021    DILATATION AND CURETTAGE HYSTEROSCOPY  [id number] ENDOMETRIAL ABLATION performed by Olivier Harvey MD at [facility] [address]    right    [address] [facility]    exploratory-Dr. Doreen Mathews  2004    Dr. Claire Blanco     Family History   Problem Relation Age of Onset    Heart Disease Father     COPD Father     Diabetes Sister     COPD Paternal Grandfather     Emphysema Paternal Grandfather     Asthma Paternal Grandfather     Diabetes Mother     High Cholesterol Mother     High Blood Pressure Maternal Grandmother     Glaucoma Maternal Grandmother     Stroke Maternal Grandfather     High Cholesterol Paternal Grandmother     Other Paternal Grandmother         begnin brain tumor     Social History     Tobacco Use    Smoking status: Never Smoker    Smokeless tobacco: Never Used   Substance Use Topics    Alcohol use: No     Alcohol/week: 0.0 standard drinks     Comment: rarely      Current Outpatient Medications   Medication Sig Dispense Refill    butalbital-acetaminophen-caffeine (FIORICET, ESGIC) -40 MG per tablet Take 1 or 2 tablets by mouth every 6 hours as needed for headache 90 tablet 0    hydrOXYzine (ATARAX) 25 MG tablet Take 1 tablet by mouth nightly as needed for Itching (sleep) 30 tablet 1    mirabegron (MYRBETRIQ) 25 MG TB24 Take 1 tablet by mouth daily 30 tablet 3    escitalopram (LEXAPRO) 20 MG tablet Take 1 tablet by mouth daily 30 tablet 5    buPROPion (WELLBUTRIN XL) 150 MG extended release tablet Take 1 tablet by mouth every morning 90 tablet 1    montelukast (SINGULAIR) 10 MG tablet Take 1 tablet by mouth once daily 90 tablet 2    atorvastatin (LIPITOR) 20 MG tablet Take 1 tablet by mouth daily 90 tablet 1    ibuprofen (ADVIL;MOTRIN) 600 MG tablet Take 1 tablet by mouth every 6 hours as needed for Pain 30 tablet 1    acetaminophen (AMINOFEN) 325 MG tablet Take 2 tablets by mouth every 6 hours as needed for Pain 120 tablet 3    cyclobenzaprine (FLEXERIL) 10 MG tablet Take 1 tablet by mouth every 8 hours as needed for Muscle spasms 30 tablet 1    albuterol sulfate HFA (PROAIR HFA) 108 (90 Base) MCG/ACT inhaler Inhale 2 puffs into the lungs every 6 hours as needed for Wheezing 1 Inhaler 5    diclofenac sodium (VOLTAREN) 1 % GEL Apply 4 g topically 4 times daily as needed for Pain 100 g 1    albuterol (PROVENTIL) (2.5 MG/3ML) 0.083% nebulizer solution Take 3 mLs by nebulization every 6 hours as needed for Wheezing 25 vial 1    diclofenac sodium 1 % GEL Apply 4 g topically 4 times daily 4 Tube 1    dicyclomine (BENTYL) 20 MG tablet TAKE 1 TABLET BY MOUTH THREE TIMES DAILY AS NEEDED FOR ABDOMINAL CRAMPS 60 tablet 0    sucralfate (CARAFATE) 1 GM tablet Take 1 g by mouth 2 times daily   11    dexlansoprazole (DEXILANT) 60 MG CPDR capsule Take 60 mg by mouth daily       No current facility-administered medications for this visit.      No Known Allergies  Health Maintenance   Topic Date Due    Hepatitis C screen  Never done    HIV screen  Never done    Cervical cancer screen  12/01/2017    Flu vaccine (1) 09/01/2021    Lipid screen  06/05/2022    DTaP/Tdap/Td vaccine (2 - Td or Tdap) 10/31/2024    Pneumococcal 0-64 years Vaccine (2 of 2 - PPSV23) 04/20/2042    COVID-19 Vaccine  Completed    Hepatitis A vaccine  Aged Out    Hepatitis B vaccine  Aged Out    Hib vaccine  Aged Out    Meningococcal (ACWY) vaccine  Aged [employer], UA 07/21/2021  8:53 AM Unknown   yellow Clarity, UA 07/21/2021  8:53 AM Unknown   clear    Glucose, UA POC 07/21/2021  8:53 AM Unknown   neg    Bilirubin, UA 07/21/2021  8:53 AM Unknown   neg    Ketones, UA 07/21/2021  8:53 AM Unknown   neg    Spec Grav, UA 07/21/2021  8:53 AM Unknown   1.015    Blood, UA POC 07/21/2021  8:53 AM Unknown   neg    pH, UA 07/21/2021  8:53 AM Unknown   6.0    Protein, UA POC 07/21/2021  8:53 AM Unknown   neg    Urobilinogen, UA 07/21/2021  8:53 AM Unknown   0.2    Leukocytes, UA 07/21/2021  8:53 AM Unknown   neg    Nitrite, UA 07/21/2021  8:53 AM Unknown   neg    Lab and Collection    POCT Urinalysis No Micro (Auto) - 7/21/2021  Result Information    Flag: Normal Status: Final result (Collected: 7/21/2021 08:53) Provider Status: Reviewed   All Reviewers List    BERE Jc CNP on 7/21/2021 10:01   Routing History    Priority Sent On From To Message Type    7/21/2021  8:54 AM Eva Aguirre CMA (AAMA) BERE Jc CNP Results   Click to Print Result   View SmartLink Info    POCT Urinalysis No Micro [employer]) (Order #[id number]) on 7/21/21   Order Report    Order Details      Objective:     Physical Exam  Vitals and nursing note reviewed. Constitutional:       Appearance: Normal appearance. She is well-developed and normal weight. HENT:      Head: Normocephalic. Right Ear: Tympanic membrane and ear canal normal.      Left Ear: Tympanic membrane and ear canal normal.      Nose: Nose normal.      Mouth/Throat:      Pharynx: Oropharynx is clear. Eyes:      Extraocular Movements: Extraocular movements intact. Conjunctiva/sclera: Conjunctivae normal.   Cardiovascular:      Rate and Rhythm: Normal rate and regular rhythm. Pulses: Normal pulses. Heart sounds: Normal heart sounds. Pulmonary:      Effort: Pulmonary effort is normal.      Breath sounds: Normal breath sounds. Abdominal:      General: Bowel sounds are normal.      Palpations: Abdomen is soft.    Musculoskeletal: General: Normal range of motion. Cervical back: Neck supple. Skin:     General: Skin is warm and dry. Neurological:      General: No focal deficit present. Mental Status: She is alert and oriented to person, place, and time. Psychiatric:         Mood and Affect: Mood normal.         Thought Content: Thought content normal.         Judgment: Judgment normal.       /76   Pulse 72   Temp 98.9 °F (37.2 °C) (Oral)   Resp 20   Ht 5' 7.5\" (1.715 m)   Wt 227 lb (103 kg)   BMI 35.03 kg/m²       Impression/Plan:  1. Urinary frequency    2. Depression, unspecified depression type    3. Mild intermittent asthma, unspecified whether complicated    4. Irritable bowel syndrome with diarrhea    5. Gastroesophageal reflux disease, unspecified whether esophagitis present    6. Mixed hyperlipidemia    7. History of tension headache    8. OAB (overactive bladder)    9. Urge incontinence    10. Stress incontinence    11. Sleep disturbance      Requested Prescriptions     Signed Prescriptions Disp Refills    butalbital-acetaminophen-caffeine (FIORICET, ESGIC) -40 MG per tablet 90 tablet 0     Sig: Take 1 or 2 tablets by mouth every 6 hours as needed for headache    hydrOXYzine (ATARAX) 25 MG tablet 30 tablet 1     Sig: Take 1 tablet by mouth nightly as needed for Itching (sleep)    mirabegron (MYRBETRIQ) 25 MG TB24 30 tablet 3     Sig: Take 1 tablet by mouth daily     Orders Placed This Encounter   Procedures    POCT Urinalysis No Micro (Auto)       Patient giveneducational materials - see patient instructions. Discussed use, benefit, and side effects of prescribed medications. All patient questions answered. Pt voiced understanding. Reviewed health maintenance. Patient agreedwith treatment plan. Follow up as directed. Overactive bladder information provided to patient. Recommend patient follow-up with her GYN. Myrbetriq 25 mg 1 p.o. daily.   Hydroxyzine 25 1 p.o. at bedtime for itching aSmantha Vargas.   Patient declines consultation with the pelvic floor therapist at this time    Electronically signed by BERE Ordaz CNP on 7/26/2021 at 12:51 PM

## 2021-08-26 RX ORDER — BUTALBITAL, ACETAMINOPHEN AND CAFFEINE 50; 325; 40 MG/1; MG/1; MG/1
TABLET ORAL
Qty: 90 TABLET | Refills: 0 | Status: SHIPPED | OUTPATIENT
Start: 2021-08-26 | End: 2021-09-30 | Stop reason: SDUPTHER

## 2021-09-13 RX ORDER — ATORVASTATIN CALCIUM 20 MG/1
20 TABLET, FILM COATED ORAL DAILY
Qty: 90 TABLET | Refills: 2 | Status: SHIPPED | OUTPATIENT
Start: 2021-09-13 | End: 2022-06-15

## 2021-09-16 RX ORDER — HYDROXYZINE HYDROCHLORIDE 25 MG/1
TABLET, FILM COATED ORAL
Qty: 30 TABLET | Refills: 1 | Status: SHIPPED | OUTPATIENT
Start: 2021-09-16 | End: 2021-12-04 | Stop reason: SDUPTHER

## 2021-09-30 RX ORDER — BUTALBITAL, ACETAMINOPHEN AND CAFFEINE 50; 325; 40 MG/1; MG/1; MG/1
TABLET ORAL
Qty: 90 TABLET | Refills: 0 | Status: SHIPPED | OUTPATIENT
Start: 2021-09-30 | End: 2021-10-28 | Stop reason: SDUPTHER

## 2021-10-07 DIAGNOSIS — F32.A DEPRESSION, UNSPECIFIED DEPRESSION TYPE: ICD-10-CM

## 2021-10-08 RX ORDER — ESCITALOPRAM OXALATE 20 MG/1
20 TABLET ORAL DAILY
Qty: 30 TABLET | Refills: 5 | Status: SHIPPED | OUTPATIENT
Start: 2021-10-08 | End: 2022-04-08

## 2021-10-08 RX ORDER — BUPROPION HYDROCHLORIDE 150 MG/1
150 TABLET ORAL EVERY MORNING
Qty: 90 TABLET | Refills: 1 | Status: SHIPPED | OUTPATIENT
Start: 2021-10-08 | End: 2022-04-08

## 2021-10-18 RX ORDER — CYCLOBENZAPRINE HCL 10 MG
10 TABLET ORAL EVERY 8 HOURS PRN
Qty: 30 TABLET | Refills: 1 | Status: SHIPPED | OUTPATIENT
Start: 2021-10-18 | End: 2022-04-13 | Stop reason: SDUPTHER

## 2021-10-28 RX ORDER — BUTALBITAL, ACETAMINOPHEN AND CAFFEINE 50; 325; 40 MG/1; MG/1; MG/1
TABLET ORAL
Qty: 90 TABLET | Refills: 0 | Status: SHIPPED | OUTPATIENT
Start: 2021-10-28 | End: 2021-12-01 | Stop reason: SDUPTHER

## 2021-12-01 RX ORDER — BUTALBITAL, ACETAMINOPHEN AND CAFFEINE 50; 325; 40 MG/1; MG/1; MG/1
TABLET ORAL
Qty: 90 TABLET | Refills: 0 | Status: SHIPPED | OUTPATIENT
Start: 2021-12-01 | End: 2021-12-16

## 2021-12-06 RX ORDER — HYDROXYZINE HYDROCHLORIDE 25 MG/1
25 TABLET, FILM COATED ORAL NIGHTLY PRN
Qty: 30 TABLET | Refills: 1 | Status: SHIPPED | OUTPATIENT
Start: 2021-12-06 | End: 2022-04-13 | Stop reason: SDUPTHER

## 2021-12-06 RX ORDER — MONTELUKAST SODIUM 10 MG/1
10 TABLET ORAL NIGHTLY
Qty: 90 TABLET | Refills: 2 | Status: SHIPPED | OUTPATIENT
Start: 2021-12-06 | End: 2022-09-15 | Stop reason: SDUPTHER

## 2021-12-15 RX ORDER — MONTELUKAST SODIUM 10 MG/1
TABLET ORAL
Qty: 90 TABLET | Refills: 0 | OUTPATIENT
Start: 2021-12-15

## 2021-12-15 RX ORDER — HYDROXYZINE HYDROCHLORIDE 25 MG/1
TABLET, FILM COATED ORAL
Qty: 30 TABLET | Refills: 0 | OUTPATIENT
Start: 2021-12-15

## 2021-12-16 RX ORDER — BUTALBITAL, ACETAMINOPHEN AND CAFFEINE 50; 325; 40 MG/1; MG/1; MG/1
TABLET ORAL
Qty: 90 TABLET | Refills: 0 | Status: SHIPPED | OUTPATIENT
Start: 2021-12-16 | End: 2022-01-27 | Stop reason: SDUPTHER

## 2022-01-03 ENCOUNTER — OFFICE VISIT (OUTPATIENT)
Dept: FAMILY MEDICINE CLINIC | Age: 47
End: 2022-01-03
Payer: COMMERCIAL

## 2022-01-03 VITALS
WEIGHT: 226 LBS | BODY MASS INDEX: 34.25 KG/M2 | HEART RATE: 88 BPM | TEMPERATURE: 98.8 F | RESPIRATION RATE: 20 BRPM | DIASTOLIC BLOOD PRESSURE: 60 MMHG | SYSTOLIC BLOOD PRESSURE: 106 MMHG | HEIGHT: 68 IN

## 2022-01-03 DIAGNOSIS — S39.012A ACUTE MYOFASCIAL STRAIN OF LUMBAR REGION, INITIAL ENCOUNTER: Primary | ICD-10-CM

## 2022-01-03 PROCEDURE — 99213 OFFICE O/P EST LOW 20 MIN: CPT | Performed by: FAMILY MEDICINE

## 2022-01-03 PROCEDURE — G8417 CALC BMI ABV UP PARAM F/U: HCPCS | Performed by: FAMILY MEDICINE

## 2022-01-03 PROCEDURE — 96372 THER/PROPH/DIAG INJ SC/IM: CPT | Performed by: FAMILY MEDICINE

## 2022-01-03 PROCEDURE — G8427 DOCREV CUR MEDS BY ELIG CLIN: HCPCS | Performed by: FAMILY MEDICINE

## 2022-01-03 PROCEDURE — G8484 FLU IMMUNIZE NO ADMIN: HCPCS | Performed by: FAMILY MEDICINE

## 2022-01-03 PROCEDURE — 1036F TOBACCO NON-USER: CPT | Performed by: FAMILY MEDICINE

## 2022-01-03 RX ORDER — METHYLPREDNISOLONE ACETATE 80 MG/ML
160 INJECTION, SUSPENSION INTRA-ARTICULAR; INTRALESIONAL; INTRAMUSCULAR; SOFT TISSUE ONCE
Status: COMPLETED | OUTPATIENT
Start: 2022-01-03 | End: 2022-01-03

## 2022-01-03 RX ADMIN — METHYLPREDNISOLONE ACETATE 160 MG: 80 INJECTION, SUSPENSION INTRA-ARTICULAR; INTRALESIONAL; INTRAMUSCULAR; SOFT TISSUE at 12:09

## 2022-01-03 SDOH — ECONOMIC STABILITY: FOOD INSECURITY: WITHIN THE PAST 12 MONTHS, YOU WORRIED THAT YOUR FOOD WOULD RUN OUT BEFORE YOU GOT MONEY TO BUY MORE.: NEVER TRUE

## 2022-01-03 SDOH — ECONOMIC STABILITY: FOOD INSECURITY: WITHIN THE PAST 12 MONTHS, THE FOOD YOU BOUGHT JUST DIDN'T LAST AND YOU DIDN'T HAVE MONEY TO GET MORE.: NEVER TRUE

## 2022-01-03 ASSESSMENT — PATIENT HEALTH QUESTIONNAIRE - PHQ9
SUM OF ALL RESPONSES TO PHQ QUESTIONS 1-9: 0
SUM OF ALL RESPONSES TO PHQ QUESTIONS 1-9: 0
SUM OF ALL RESPONSES TO PHQ9 QUESTIONS 1 & 2: 0
SUM OF ALL RESPONSES TO PHQ QUESTIONS 1-9: 0
2. FEELING DOWN, DEPRESSED OR HOPELESS: 0
1. LITTLE INTEREST OR PLEASURE IN DOING THINGS: 0
SUM OF ALL RESPONSES TO PHQ QUESTIONS 1-9: 0
1. LITTLE INTEREST OR PLEASURE IN DOING THINGS: 0
SUM OF ALL RESPONSES TO PHQ QUESTIONS 1-9: 0
SUM OF ALL RESPONSES TO PHQ QUESTIONS 1-9: 0
2. FEELING DOWN, DEPRESSED OR HOPELESS: 0
SUM OF ALL RESPONSES TO PHQ9 QUESTIONS 1 & 2: 0

## 2022-01-03 ASSESSMENT — SOCIAL DETERMINANTS OF HEALTH (SDOH): HOW HARD IS IT FOR YOU TO PAY FOR THE VERY BASICS LIKE FOOD, HOUSING, MEDICAL CARE, AND HEATING?: NOT HARD AT ALL

## 2022-01-03 NOTE — PROGRESS NOTES
Administrations This Visit     methylPREDNISolone acetate (DEPO-MEDROL) injection 160 mg     Admin Date  01/03/2022  12:09 Action  Given Dose  160 mg Route  IntraMUSCular Site  Dorsogluteal Right Administered By  Estela Robles RN    Ordering Provider: Ce Richards MD    NDC: 1767-1239-94    Lot#: [id number]    : [id number]     Patient Supplied?: No

## 2022-01-09 ASSESSMENT — ENCOUNTER SYMPTOMS
RHINORRHEA: 0
EYES NEGATIVE: 1
VOMITING: 0
BACK PAIN: 1
ABDOMINAL PAIN: 0
CHEST TIGHTNESS: 0
SHORTNESS OF BREATH: 0
NAUSEA: 0
COUGH: 0
SORE THROAT: 0
DIARRHEA: 0

## 2022-01-09 NOTE — PROGRESS NOTES
[address] [facility]  [address]  [facility] [address]  Dept: [phone]  Dept Fax: [phone]  Loc: [phone]  PROGRESS NOTE      VisitDate: 1/3/2022    Jayleen Major is a 42 y.o. female who presents today for:     Chief Complaint   Patient presents with    Lower Back Pain     constant, dull pain in the center to right side, bending over causing sharp pain. Denies numbness or tingling. Sx started a week ago and has taken motrin 400mg tid and flexeril. Denies injury         Subjective:  HPI  Comes in 1 week history of low back pain. She had sudden onset without known injury no radicular symptoms. Pain increased with activity better at rest, no relief with over-the-counter medicines    Review of Systems   Constitutional: Negative for activity change, appetite change, fatigue and fever. HENT: Negative for congestion, rhinorrhea and sore throat. Eyes: Negative. Respiratory: Negative for cough, chest tightness and shortness of breath. Cardiovascular: Negative for chest pain and palpitations. Gastrointestinal: Negative for abdominal pain, diarrhea, nausea and vomiting. Genitourinary: Negative for dysuria and urgency. Musculoskeletal: Positive for back pain. Negative for arthralgias. Neurological: Negative for dizziness and headaches. Psychiatric/Behavioral: Negative for dysphoric mood. The patient is not nervous/anxious.       Past Medical History:   Diagnosis Date    Anxiety     Arthritis     Asthma     Depression     Diverticulitis     GERD (gastroesophageal reflux disease)     Hiatal hernia     Hyperlipidemia     Migraines     Obesity       Past Surgical History:   Procedure Laterality Date    CHOLECYSTECTOMY, LAPAROSCOPIC  3/4/13    Dr. Danna Self N/A 3/15/2021    DILATATION AND CURETTAGE HYSTEROSCOPY  MYOSURE ENDOMETRIAL ABLATION performed by Noreen Spence MD at [facility]    right    [address] [facility]    exploratory-Dr. Micheal Ulrich  2004    Dr. Jesus Niño     Family History   Problem Relation Age of Onset    Heart Disease Father     COPD Father     Diabetes Sister     COPD Paternal Grandfather     Emphysema Paternal Grandfather     Asthma Paternal Grandfather     Diabetes Mother     High Cholesterol Mother     High Blood Pressure Maternal Grandmother     Glaucoma Maternal Grandmother     Stroke Maternal Grandfather     High Cholesterol Paternal Grandmother     Other Paternal Grandmother         begnin brain tumor     Social History     Tobacco Use    Smoking status: Never Smoker    Smokeless tobacco: Never Used   Substance Use Topics    Alcohol use: No     Alcohol/week: 0.0 standard drinks     Comment: rarely      Current Outpatient Medications   Medication Sig Dispense Refill    butalbital-acetaminophen-caffeine (FIORICET, ESGIC) -40 MG per tablet TAKE 1 TO 2 TABLETS BY MOUTH EVERY 6 HOURS AS NEEDED FOR HEADACHE 90 tablet 0    montelukast (SINGULAIR) 10 MG tablet Take 1 tablet by mouth nightly 90 tablet 2    hydrOXYzine (ATARAX) 25 MG tablet Take 1 tablet by mouth nightly as needed for Itching 30 tablet 1    cyclobenzaprine (FLEXERIL) 10 MG tablet Take 1 tablet by mouth every 8 hours as needed for Muscle spasms 30 tablet 1    buPROPion (WELLBUTRIN XL) 150 MG extended release tablet Take 1 tablet by mouth every morning 90 tablet 1    escitalopram (LEXAPRO) 20 MG tablet Take 1 tablet by mouth daily 30 tablet 5    atorvastatin (LIPITOR) 20 MG tablet Take 1 tablet by mouth daily 90 tablet 2    ibuprofen (ADVIL;MOTRIN) 600 MG tablet Take 1 tablet by mouth every 6 hours as needed for Pain (Patient taking differently: Take 400 mg by mouth every 8 hours as needed for Pain ) 30 tablet 1    albuterol sulfate HFA (PROAIR HFA) 108 (90 Base) MCG/ACT inhaler Inhale 2 puffs into the lungs every 6 hours as needed for Wheezing 1 Inhaler 5    diclofenac sodium (VOLTAREN) 1 % GEL Apply 4 g topically 4 times daily as needed for Pain 100 g 1    albuterol (PROVENTIL) (2.5 MG/3ML) 0.083% nebulizer solution Take 3 mLs by nebulization every 6 hours as needed for Wheezing 25 vial 1    dicyclomine (BENTYL) 20 MG tablet TAKE 1 TABLET BY MOUTH THREE TIMES DAILY AS NEEDED FOR ABDOMINAL CRAMPS 60 tablet 0    sucralfate (CARAFATE) 1 GM tablet Take 1 g by mouth 2 times daily   11    dexlansoprazole (DEXILANT) 60 MG CPDR capsule Take 60 mg by mouth daily      acetaminophen (AMINOFEN) 325 MG tablet Take 2 tablets by mouth every 6 hours as needed for Pain (Patient not taking: Reported on 1/3/2022) 120 tablet 3     No current facility-administered medications for this visit. No Known Allergies  Health Maintenance   Topic Date Due    Hepatitis C screen  Never done    HIV screen  Never done    Cervical cancer screen  Never done    Lipid screen  06/05/2022    Depression Monitoring  01/03/2023    DTaP/Tdap/Td vaccine (2 - Td or Tdap) 10/31/2024    Pneumococcal 0-64 years Vaccine (2 of 2 - PPSV23) 04/20/2042    Flu vaccine  Completed    COVID-19 Vaccine  Completed    Hepatitis A vaccine  Aged Out    Hepatitis B vaccine  Aged Out    Hib vaccine  Aged Out    Meningococcal (ACWY) vaccine  Aged Out         Objective:     Physical Exam  Constitutional:       General: She is not in acute distress. Appearance: She is well-developed. She is not diaphoretic. HENT:      Head: Normocephalic and atraumatic. Eyes:      General: No scleral icterus. Conjunctiva/sclera: Conjunctivae normal.   Neck:      Thyroid: No thyromegaly. Vascular: No JVD. Comments: No bruits  Cardiovascular:      Rate and Rhythm: Normal rate and regular rhythm. Heart sounds: Normal heart sounds. Pulmonary:      Effort: Pulmonary effort is normal. No respiratory distress. Breath sounds: Normal breath sounds. No wheezing or rales. Abdominal:      Palpations: Abdomen is soft. There is no mass. Tenderness: There is no abdominal tenderness. There is no guarding. Musculoskeletal:         General: Tenderness present. Comments: Paraspinous muscle tenderness lumbar spine   Skin:     General: Skin is warm and dry. Findings: No rash. Neurological:      Mental Status: She is alert and oriented to person, place, and time. Cranial Nerves: No cranial nerve deficit. /60   Pulse 88   Temp 98.8 °F (37.1 °C) (Oral)   Resp 20   Ht 5' 7.5\" (1.715 m)   Wt 226 lb (102.5 kg)   BMI 34.87 kg/m²       Impression/Plan:  1. Acute myofascial strain of lumbar region, initial encounter      Requested Prescriptions      No prescriptions requested or ordered in this encounter     No orders of the defined types were placed in this encounter. Methylprednisolone 160 IM    Patient giveneducational materials - see patient instructions. Discussed use, benefit, and side effects of prescribed medications. All patient questions answered. Pt voiced understanding. Reviewed health maintenance. Patient agreedwith treatment plan. Follow up as directed. **This report has been created using voice recognition software. It may contain minor errorswhich are inherent in voice recognition technology. **       Electronically signed by Dameon Griffith MD on 1/9/2022 at 12:09 PM

## 2022-01-28 RX ORDER — BUTALBITAL, ACETAMINOPHEN AND CAFFEINE 50; 325; 40 MG/1; MG/1; MG/1
TABLET ORAL
Qty: 90 TABLET | Refills: 0 | Status: SHIPPED | OUTPATIENT
Start: 2022-01-28 | End: 2022-03-10 | Stop reason: SDUPTHER

## 2022-02-07 ENCOUNTER — OFFICE VISIT (OUTPATIENT)
Dept: FAMILY MEDICINE CLINIC | Age: 47
End: 2022-02-07
Payer: COMMERCIAL

## 2022-02-07 VITALS
TEMPERATURE: 98.1 F | SYSTOLIC BLOOD PRESSURE: 110 MMHG | HEIGHT: 67 IN | DIASTOLIC BLOOD PRESSURE: 80 MMHG | HEART RATE: 92 BPM | RESPIRATION RATE: 20 BRPM | WEIGHT: 224 LBS | BODY MASS INDEX: 35.16 KG/M2

## 2022-02-07 DIAGNOSIS — G89.29 CHRONIC PAIN OF LEFT KNEE: ICD-10-CM

## 2022-02-07 DIAGNOSIS — M23.52 RECURRENT LEFT KNEE INSTABILITY: Primary | ICD-10-CM

## 2022-02-07 DIAGNOSIS — M25.562 CHRONIC PAIN OF LEFT KNEE: ICD-10-CM

## 2022-02-07 PROCEDURE — G8427 DOCREV CUR MEDS BY ELIG CLIN: HCPCS | Performed by: NURSE PRACTITIONER

## 2022-02-07 PROCEDURE — 96372 THER/PROPH/DIAG INJ SC/IM: CPT | Performed by: NURSE PRACTITIONER

## 2022-02-07 PROCEDURE — G8484 FLU IMMUNIZE NO ADMIN: HCPCS | Performed by: NURSE PRACTITIONER

## 2022-02-07 PROCEDURE — 1036F TOBACCO NON-USER: CPT | Performed by: NURSE PRACTITIONER

## 2022-02-07 PROCEDURE — G8417 CALC BMI ABV UP PARAM F/U: HCPCS | Performed by: NURSE PRACTITIONER

## 2022-02-07 PROCEDURE — 99213 OFFICE O/P EST LOW 20 MIN: CPT | Performed by: NURSE PRACTITIONER

## 2022-02-07 RX ORDER — METHYLPREDNISOLONE ACETATE 80 MG/ML
120 INJECTION, SUSPENSION INTRA-ARTICULAR; INTRALESIONAL; INTRAMUSCULAR; SOFT TISSUE ONCE
Status: COMPLETED | OUTPATIENT
Start: 2022-02-07 | End: 2022-02-07

## 2022-02-07 RX ORDER — LIDOCAINE 40 MG/G
CREAM TOPICAL
Qty: 120 G | Refills: 1 | Status: SHIPPED | OUTPATIENT
Start: 2022-02-07

## 2022-02-07 RX ORDER — TRAMADOL HYDROCHLORIDE 50 MG/1
50 TABLET ORAL EVERY 4 HOURS PRN
Qty: 42 TABLET | Refills: 0 | Status: SHIPPED | OUTPATIENT
Start: 2022-02-07 | End: 2022-02-14 | Stop reason: SDUPTHER

## 2022-02-07 RX ADMIN — METHYLPREDNISOLONE ACETATE 120 MG: 80 INJECTION, SUSPENSION INTRA-ARTICULAR; INTRALESIONAL; INTRAMUSCULAR; SOFT TISSUE at 12:45

## 2022-02-07 NOTE — PROGRESS NOTES
Administrations This Visit     methylPREDNISolone acetate (DEPO-MEDROL) injection 120 mg     Admin Date  02/07/2022  12:45 Action  Given Dose  120 mg Route  IntraMUSCular Site  Dorsogluteal Left Administered By  Tarri Hamman, RN    Ordering Provider: BERE Gonzalez CNP    NDC: [address]-0    Lot#: [id number]    : [address] [place] [address] Andre    Patient Supplied?: No

## 2022-02-07 NOTE — PATIENT INSTRUCTIONS
Patient Education        Meniscus Tear: Care Instructions  Overview     The meniscus is rubbery tissue in the knee that acts as a shock absorber between the upper and lower leg bones. The meniscus also keeps your knee stable by spreading weight across it. Each knee has two menisci (plural of meniscus). You can tear a meniscus if you plant your foot and twist, or pivot. The meniscus also can wear down as you age, and it can tear from squatting or kneeling. Small tears may heal on their own with rest and some physical therapy. But a more serious tear may need surgery to repair it or to remove part of the meniscus. Your doctor may want you to see a doctor who specializes in bones and sports injuries. Follow-up care is a key part of your treatment and safety. Be sure to make and go to all appointments, and call your doctor if you are having problems. It's also a good idea to know your test results and keep a list of the medicines you take. How can you care for yourself at home? · Rest your knee when possible. · Do not squat or kneel. · Take pain medicines exactly as directed. ? If the doctor gave you a prescription medicine for pain, take it as prescribed. ? If you are not taking a prescription pain medicine, ask your doctor if you can take an over-the-counter medicine. · Put ice or a cold pack on your knee for 10 to 20 minutes at a time. Try to do this every 1 to 2 hours for the next 3 days (when you are awake) or until the swelling goes down. Put a thin cloth between the ice and your skin. · Prop up the sore leg on a pillow when you ice your knee or any time you sit or lie down during the next 3 days. Try to keep your leg above the level of your heart. This will help reduce swelling. · Follow your doctor's directions for using crutches or a knee brace, if suggested. · Follow your doctor's directions for exercises to keep your knee mobile and your leg muscles strong.  Here are a few exercises you can try if your doctor says it is okay. ? Quad sets: Lie down on the floor or the bed with your injured leg straight. Fully extend your legthere should be no or little bend in your knee. Tighten the thigh (quadriceps) of your injured leg for 6 seconds. Do not lift your heel up. Relax your quadriceps for 10 seconds. Repeat this exercise 8 to 12 times several times during the day. ? Straight-leg raises: Lie down on the floor or the bed with your injured leg flat and your uninjured leg bent so that the bottom of your foot is on the floor or bed. Tighten the quadriceps of your injured leg. Keeping your knee as straight as possible, lift your injured leg off the bed until it is about 18 inches above the bed or floor. Lower your leg back down and relax for 5 seconds. Do 3 sets of 20 repetitions, or if you tire quickly, 3 sets of 8 to 12 repetitions. ? Heel raises: Stand with your feet a few inches apart. Rest your hands lightly on a counter or chair in front of you. Slowly raise your heels off the floor while keeping your knees straight. Hold for 3 seconds, then slowly lower your heels to the floor. Do 3 sets of 8 to 12 repetitions. ? Heel slides: Lie down on the floor or the bed with your leg flat. Slowly begin to slide your heel toward your rear end (buttocks), keeping your heel on the floor. Your knee will begin to bend. Slide your heel and bend your knee until it becomes a little sore and you can feel a small amount of pressure inside your knee. Hold this position for 10 seconds. Slide your heel back down until your leg is straight on the floor. Relax for 10 seconds. Repeat this exercise 20 times. When should you call for help? Watch closely for changes in your health, and be sure to contact your doctor if:    · You have increasing knee pain or swelling or both.     · Your knee is so sore or stiff that you cannot walk on it.     · You do not get better as expected. Where can you learn more?   Go to

## 2022-02-08 ASSESSMENT — ENCOUNTER SYMPTOMS
BACK PAIN: 0
ABDOMINAL PAIN: 0
CHEST TIGHTNESS: 0
WHEEZING: 0
SHORTNESS OF BREATH: 0
COUGH: 0
ABDOMINAL DISTENTION: 0

## 2022-02-08 NOTE — PROGRESS NOTES
[facility]  [address] [place] [address]  Dept: [phone]  Dept Fax: [phone]  Loc: [phone]  PROGRESS NOTE      VisitDate: 2/7/2022    Rufus Frausto is a 42 y.o. female who presents today for:     Chief Complaint   Patient presents with    Knee Pain     C/o left knee pain, sharp pain when goes up or down steps, gives out with weight bearing, feels grinding, popping sensation with weight bearing. Sx for 10 years, flared up 2 wks ago and has taken motrin 400mg tid, or aleve every 12 hrs. Denies injury. Had Xray 2018 and has seen [facility] in past.          Subjective:  Patient presents with continued worsening left knee pain, knee instability for the past 2 weeks. Reports that she has had chronic knee pain for over 10 years. Has tried Motrin as well as Aleve with minimal success. Currently wearing a brace. Reports that knee will frequently  give out on her. Has consulted with Ortho approximately 5 years ago for left knee pain. History of anxiety/depression, asthma diverticulitis, hyperlipidemia, migraines, arthritis. Patient denies any specific injury to the knee. Review of Systems   Constitutional: Negative for activity change, appetite change, chills, fatigue and fever. Eyes: Negative for visual disturbance. Respiratory: Negative for cough, chest tightness, shortness of breath and wheezing. Cardiovascular: Negative for chest pain, palpitations and leg swelling. Gastrointestinal: Negative for abdominal distention and abdominal pain. Genitourinary: Negative for dysuria. Musculoskeletal: Positive for arthralgias and gait problem. Negative for back pain and neck pain. Skin: Negative. Negative for rash. Neurological: Negative for dizziness, light-headedness and headaches. Hematological: Negative for adenopathy. Psychiatric/Behavioral: Positive for decreased concentration and dysphoric mood.  The patient is nervous/anxious. All other systems reviewed and are negative. Past Medical History:   Diagnosis Date    Anxiety     Arthritis     Asthma     Depression     Diverticulitis     GERD (gastroesophageal reflux disease)     Hiatal hernia     Hyperlipidemia     Migraines     Obesity       Past Surgical History:   Procedure Laterality Date    CHOLECYSTECTOMY, LAPAROSCOPIC  3/4/13    Dr. Fermin Galeana N/A 3/15/2021    DILATATION AND CURETTAGE HYSTEROSCOPY  [address] ENDOMETRIAL ABLATION performed by Alexander Fenton MD at [facility] [address]    right    [address]    exploratory-Dr. Karina Garcia  2004    Dr. Tash Albright     Family History   Problem Relation Age of Onset    Heart Disease Father     COPD Father     Diabetes Sister     COPD Paternal Grandfather     Emphysema Paternal Grandfather     Asthma Paternal Grandfather     Diabetes Mother     High Cholesterol Mother     High Blood Pressure Maternal Grandmother     Glaucoma Maternal Grandmother     Stroke Maternal Grandfather     High Cholesterol Paternal Grandmother     Other Paternal Grandmother         begnin brain tumor     Social History     Tobacco Use    Smoking status: Never Smoker    Smokeless tobacco: Never Used   Substance Use Topics    Alcohol use: No     Alcohol/week: 0.0 standard drinks     Comment: rarely      Current Outpatient Medications   Medication Sig Dispense Refill    traMADol (ULTRAM) 50 MG tablet Take 1 tablet by mouth every 4 hours as needed for Pain for up to 7 days. Intended supply: 7 days. Take lowest dose possible to manage pain 42 tablet 0    lidocaine (LMX) 4 % cream To left knee 3 times daily as needed for pain. Apply topically as needed.  120 g 1    butalbital-acetaminophen-caffeine (FIORICET, ESGIC) -40 MG per tablet TAKE 1 TO 2 TABLETS BY MOUTH EVERY 6 HOURS AS NEEDED FOR HEADACHE 90 tablet 0    montelukast (SINGULAIR) 10 MG tablet Take 1 tablet by mouth nightly 90 tablet 2    hydrOXYzine (ATARAX) 25 MG tablet Take 1 tablet by mouth nightly as needed for Itching 30 tablet 1    cyclobenzaprine (FLEXERIL) 10 MG tablet Take 1 tablet by mouth every 8 hours as needed for Muscle spasms 30 tablet 1    buPROPion (WELLBUTRIN XL) 150 MG extended release tablet Take 1 tablet by mouth every morning 90 tablet 1    escitalopram (LEXAPRO) 20 MG tablet Take 1 tablet by mouth daily 30 tablet 5    atorvastatin (LIPITOR) 20 MG tablet Take 1 tablet by mouth daily 90 tablet 2    ibuprofen (ADVIL;MOTRIN) 600 MG tablet Take 1 tablet by mouth every 6 hours as needed for Pain (Patient taking differently: Take 400 mg by mouth every 8 hours as needed for Pain ) 30 tablet 1    acetaminophen (AMINOFEN) 325 MG tablet Take 2 tablets by mouth every 6 hours as needed for Pain 120 tablet 3    albuterol sulfate HFA (PROAIR HFA) 108 (90 Base) MCG/ACT inhaler Inhale 2 puffs into the lungs every 6 hours as needed for Wheezing 1 Inhaler 5    diclofenac sodium (VOLTAREN) 1 % GEL Apply 4 g topically 4 times daily as needed for Pain 100 g 1    albuterol (PROVENTIL) (2.5 MG/3ML) 0.083% nebulizer solution Take 3 mLs by nebulization every 6 hours as needed for Wheezing 25 vial 1    dicyclomine (BENTYL) 20 MG tablet TAKE 1 TABLET BY MOUTH THREE TIMES DAILY AS NEEDED FOR ABDOMINAL CRAMPS 60 tablet 0    sucralfate (CARAFATE) 1 GM tablet Take 1 g by mouth 2 times daily   11    dexlansoprazole (DEXILANT) 60 MG CPDR capsule Take 60 mg by mouth daily       No current facility-administered medications for this visit.      No Known Allergies  Health Maintenance   Topic Date Due    Hepatitis C screen  Never done    HIV screen  Never done    Cervical cancer screen  Never done    Lipid screen  06/05/2022    Depression Monitoring  01/03/2023    DTaP/Tdap/Td vaccine (2 - Td or Tdap) 10/31/2024    Pneumococcal 0-64 years Vaccine (2 of 2 - PPSV23) 04/20/2042  Flu vaccine  Completed    COVID-19 Vaccine  Completed    Hepatitis A vaccine  Aged Out    Hepatitis B vaccine  Aged Out    Hib vaccine  Aged Out    Meningococcal (ACWY) vaccine  Aged Out         Objective:     Physical Exam  Vitals and nursing note reviewed. Constitutional:       Appearance: Normal appearance. She is well-developed and normal weight. HENT:      Head: Normocephalic. Right Ear: Tympanic membrane and ear canal normal.      Left Ear: Tympanic membrane and ear canal normal.      Nose: Nose normal.      Mouth/Throat:      Pharynx: Oropharynx is clear. Eyes:      Extraocular Movements: Extraocular movements intact. Conjunctiva/sclera: Conjunctivae normal.   Cardiovascular:      Rate and Rhythm: Normal rate and regular rhythm. Pulses: Normal pulses. Heart sounds: Normal heart sounds. Pulmonary:      Effort: Pulmonary effort is normal.      Breath sounds: Normal breath sounds. Abdominal:      General: Bowel sounds are normal.      Palpations: Abdomen is soft. Musculoskeletal:      Cervical back: Neck supple. Left knee: Bony tenderness and crepitus present. No swelling, deformity, effusion or erythema. Decreased range of motion. Tenderness present over the medial joint line and lateral joint line. Abnormal alignment and abnormal patellar mobility. Skin:     General: Skin is warm and dry. Neurological:      General: No focal deficit present. Mental Status: She is alert and oriented to person, place, and time. Psychiatric:         Mood and Affect: Mood normal.         Thought Content: Thought content normal.         Judgment: Judgment normal.       /80   Pulse 92   Temp 98.1 °F (36.7 °C) (Oral)   Resp 20   Ht 5' 7\" (1.702 m)   Wt 224 lb (101.6 kg)   BMI 35.08 kg/m²       Impression/Plan:  1. Recurrent left knee instability    2.  Chronic pain of left knee      Requested Prescriptions     Signed Prescriptions Disp Refills    traMADol (ULTRAM) 50 MG tablet 42 tablet 0     Sig: Take 1 tablet by mouth every 4 hours as needed for Pain for up to 7 days. Intended supply: 7 days. Take lowest dose possible to manage pain    lidocaine (LMX) 4 % cream 120 g 1     Sig: To left knee 3 times daily as needed for pain. Apply topically as needed. Orders Placed This Encounter   Procedures    XR KNEE LEFT (1-2 VIEWS)     Standing Status:   Future     Standing Expiration Date:   2/7/2023    MRI KNEE LEFT WO CONTRAST     Standing Status:   Future     Standing Expiration Date:   2/7/2023       Patient giveneducational materials - see patient instructions. Discussed use, benefit, and side effects of prescribed medications. All patient questions answered. Pt voiced understanding. Reviewed health maintenance. Patient agreedwith treatment plan. Follow up as directed. X-ray left knee. MRI left knee ordered. Depo-Medrol 120 IM office today continue wearing left knee brace. Continue Voltaren gel. Lidocaine 4% cream 3 times daily as needed pain left knee. Tramadol every 4 as needed pain 42. OARRS report reviewed. Knee exercises provided. Continue medications as prescribed.  Educational information on above diagnosis  provided per AVS.      Electronically signed by BERE Mehta CNP on 2/8/2022 at 10:01 AM

## 2022-02-09 ENCOUNTER — HOSPITAL ENCOUNTER (OUTPATIENT)
Age: 47
Discharge: HOME | End: 2022-02-09
Payer: COMMERCIAL

## 2022-02-09 ENCOUNTER — HOSPITAL ENCOUNTER (OUTPATIENT)
Dept: GENERAL RADIOLOGY | Age: 47
Discharge: HOME | End: 2022-02-09
Payer: COMMERCIAL

## 2022-02-09 ENCOUNTER — TELEPHONE (OUTPATIENT)
Dept: FAMILY MEDICINE CLINIC | Age: 47
End: 2022-02-09

## 2022-02-09 DIAGNOSIS — M25.562 CHRONIC PAIN OF LEFT KNEE: ICD-10-CM

## 2022-02-09 DIAGNOSIS — M23.52 RECURRENT LEFT KNEE INSTABILITY: ICD-10-CM

## 2022-02-09 DIAGNOSIS — G89.29 CHRONIC PAIN OF LEFT KNEE: ICD-10-CM

## 2022-02-09 PROCEDURE — 73564 X-RAY EXAM KNEE 4 OR MORE: CPT

## 2022-02-10 ENCOUNTER — TELEPHONE (OUTPATIENT)
Dept: FAMILY MEDICINE CLINIC | Age: 47
End: 2022-02-10

## 2022-02-10 NOTE — TELEPHONE ENCOUNTER
----- Message from BERE Gonzalez CNP sent at 2/10/2022  8:36 AM EST -----  X-ray indicates moderate tricompartmental joint space narrowing with marginal spurring noted. Moderate arthritic changes also noted throughout left knee. Proceed with MRI.

## 2022-02-12 ENCOUNTER — PATIENT MESSAGE (OUTPATIENT)
Dept: FAMILY MEDICINE CLINIC | Age: 47
End: 2022-02-12

## 2022-02-12 DIAGNOSIS — G89.29 CHRONIC PAIN OF LEFT KNEE: ICD-10-CM

## 2022-02-12 DIAGNOSIS — M25.562 CHRONIC PAIN OF LEFT KNEE: ICD-10-CM

## 2022-02-12 DIAGNOSIS — M23.52 RECURRENT LEFT KNEE INSTABILITY: ICD-10-CM

## 2022-02-14 ENCOUNTER — TELEPHONE (OUTPATIENT)
Dept: FAMILY MEDICINE CLINIC | Age: 47
End: 2022-02-14

## 2022-02-14 DIAGNOSIS — M25.562 CHRONIC PAIN OF LEFT KNEE: ICD-10-CM

## 2022-02-14 DIAGNOSIS — M17.12 TRICOMPARTMENT OSTEOARTHRITIS OF LEFT KNEE: ICD-10-CM

## 2022-02-14 DIAGNOSIS — G89.29 CHRONIC PAIN OF LEFT KNEE: ICD-10-CM

## 2022-02-14 DIAGNOSIS — M23.52 RECURRENT LEFT KNEE INSTABILITY: Primary | ICD-10-CM

## 2022-02-14 RX ORDER — TRAMADOL HYDROCHLORIDE 50 MG/1
50 TABLET ORAL EVERY 4 HOURS PRN
Qty: 42 TABLET | Refills: 0 | Status: SHIPPED | OUTPATIENT
Start: 2022-02-14 | End: 2022-02-21

## 2022-02-14 NOTE — TELEPHONE ENCOUNTER
From: Peter Khan  To: Libby Fontenot  Sent: 2/12/2022 6:59 PM EST  Subject: Tramadol     The prescription says that I can only take tramadol for 7 days. What should I do after that? Last day I can take it is Monday the 14th.     Concepcion Roque

## 2022-02-14 NOTE — TELEPHONE ENCOUNTER
Per Clarence, the Left Knee MRI ordered for this patient is pending denial for a peer to peer as the clinical information does not support medical necessity for the ordered test. All available clinical has been submitted at this time, including the Xray. Peer to peer available  # [phone]  Ref# [id number]    Please advise.   *patient scheduled 2/19*

## 2022-02-15 NOTE — TELEPHONE ENCOUNTER
Referral is made to [facility] and they will call the patient. She was notified and is in agreement with plan.

## 2022-03-10 RX ORDER — BUTALBITAL, ACETAMINOPHEN AND CAFFEINE 50; 325; 40 MG/1; MG/1; MG/1
TABLET ORAL
Qty: 90 TABLET | Refills: 0 | Status: SHIPPED | OUTPATIENT
Start: 2022-03-10 | End: 2022-04-08

## 2022-04-07 DIAGNOSIS — F32.A DEPRESSION, UNSPECIFIED DEPRESSION TYPE: ICD-10-CM

## 2022-04-08 RX ORDER — BUPROPION HYDROCHLORIDE 150 MG/1
TABLET ORAL
Qty: 90 TABLET | Refills: 1 | Status: SHIPPED | OUTPATIENT
Start: 2022-04-08

## 2022-04-08 RX ORDER — ESCITALOPRAM OXALATE 20 MG/1
TABLET ORAL
Qty: 30 TABLET | Refills: 5 | Status: SHIPPED | OUTPATIENT
Start: 2022-04-08 | End: 2022-10-05 | Stop reason: SDUPTHER

## 2022-04-08 RX ORDER — BUTALBITAL, ACETAMINOPHEN AND CAFFEINE 50; 325; 40 MG/1; MG/1; MG/1
TABLET ORAL
Qty: 90 TABLET | Refills: 1 | Status: SHIPPED | OUTPATIENT
Start: 2022-04-08 | End: 2022-05-12 | Stop reason: SDUPTHER

## 2022-04-14 RX ORDER — HYDROXYZINE HYDROCHLORIDE 25 MG/1
25 TABLET, FILM COATED ORAL NIGHTLY PRN
Qty: 30 TABLET | Refills: 1 | Status: SHIPPED | OUTPATIENT
Start: 2022-04-14 | End: 2022-07-28

## 2022-04-14 RX ORDER — CYCLOBENZAPRINE HCL 10 MG
10 TABLET ORAL EVERY 8 HOURS PRN
Qty: 30 TABLET | Refills: 1 | Status: SHIPPED | OUTPATIENT
Start: 2022-04-14 | End: 2022-08-20 | Stop reason: SDUPTHER

## 2022-05-05 ENCOUNTER — OFFICE VISIT (OUTPATIENT)
Dept: FAMILY MEDICINE CLINIC | Age: 47
End: 2022-05-05
Payer: COMMERCIAL

## 2022-05-05 VITALS
TEMPERATURE: 98.2 F | SYSTOLIC BLOOD PRESSURE: 120 MMHG | HEART RATE: 92 BPM | BODY MASS INDEX: 34.21 KG/M2 | OXYGEN SATURATION: 97 % | HEIGHT: 67 IN | WEIGHT: 218 LBS | DIASTOLIC BLOOD PRESSURE: 88 MMHG

## 2022-05-05 DIAGNOSIS — R00.2 PALPITATIONS: Primary | ICD-10-CM

## 2022-05-05 DIAGNOSIS — R07.9 CHEST PAIN, UNSPECIFIED TYPE: ICD-10-CM

## 2022-05-05 PROCEDURE — 99214 OFFICE O/P EST MOD 30 MIN: CPT | Performed by: FAMILY MEDICINE

## 2022-05-05 PROCEDURE — 1036F TOBACCO NON-USER: CPT | Performed by: FAMILY MEDICINE

## 2022-05-05 PROCEDURE — 93000 ELECTROCARDIOGRAM COMPLETE: CPT | Performed by: FAMILY MEDICINE

## 2022-05-05 PROCEDURE — G8427 DOCREV CUR MEDS BY ELIG CLIN: HCPCS | Performed by: FAMILY MEDICINE

## 2022-05-05 PROCEDURE — G8417 CALC BMI ABV UP PARAM F/U: HCPCS | Performed by: FAMILY MEDICINE

## 2022-05-07 ASSESSMENT — ENCOUNTER SYMPTOMS
VOMITING: 0
RHINORRHEA: 0
EYES NEGATIVE: 1
BACK PAIN: 0
SHORTNESS OF BREATH: 0
SORE THROAT: 0
NAUSEA: 0
COUGH: 0
CHEST TIGHTNESS: 0
DIARRHEA: 0
ABDOMINAL PAIN: 0

## 2022-05-07 NOTE — PROGRESS NOTES
[address] [place] [facility] [address]  Dept: [phone]  Dept Fax: [phone]  Loc: [phone]  PROGRESS NOTE      VisitDate: 5/5/2022    Prasanna You is a 41 y.o. female who presents today for:     Chief Complaint   Patient presents with    Palpitations     intermit for 2wks, daily last 4 days. C/o dull pain in the center to left side.  Hypertension     B/p was elevated at GI last week 135/90, since then 126-145/. Subjective:  HPI  Patient comes in with palpitations elevated blood pressure. Palpitations come and go and last for several minutes no chest pain pressure dizziness lightheadedness or diaphoresis    Review of Systems   Constitutional: Negative for activity change, appetite change, fatigue and fever. HENT: Negative for congestion, rhinorrhea and sore throat. Eyes: Negative. Respiratory: Negative for cough, chest tightness and shortness of breath. Cardiovascular: Negative for chest pain and palpitations. Gastrointestinal: Negative for abdominal pain, diarrhea, nausea and vomiting. Genitourinary: Negative for dysuria and urgency. Musculoskeletal: Negative for arthralgias and back pain. Neurological: Negative for dizziness and headaches. Psychiatric/Behavioral: Negative for dysphoric mood. The patient is not nervous/anxious.       Past Medical History:   Diagnosis Date    Anxiety     Arthritis     Asthma     Depression     Diverticulitis     GERD (gastroesophageal reflux disease)     Hiatal hernia     Hyperlipidemia     Migraines     Obesity       Past Surgical History:   Procedure Laterality Date    CHOLECYSTECTOMY, LAPAROSCOPIC  3/4/13    Dr. Judy Camarena N/A 3/15/2021    DILATATION AND CURETTAGE HYSTEROSCOPY  MYOSURE ENDOMETRIAL ABLATION performed by Dot Busby MD at [place] [address]    right     LAPAROSCOPY  1997    exploratory-Dr. John Miller PLACEMENT  2004    Dr. Shun Jenkins     Family History   Problem Relation Age of Onset    Heart Disease Father     COPD Father     Diabetes Sister     COPD Paternal Grandfather     Emphysema Paternal Grandfather     Asthma Paternal Grandfather     Diabetes Mother     High Cholesterol Mother     High Blood Pressure Maternal Grandmother     Glaucoma Maternal Grandmother     Stroke Maternal Grandfather     High Cholesterol Paternal Grandmother     Other Paternal Grandmother         begnin brain tumor     Social History     Tobacco Use    Smoking status: Never Smoker    Smokeless tobacco: Never Used   Substance Use Topics    Alcohol use: No     Alcohol/week: 0.0 standard drinks     Comment: rarely      Current Outpatient Medications   Medication Sig Dispense Refill    cyclobenzaprine (FLEXERIL) 10 MG tablet Take 1 tablet by mouth every 8 hours as needed for Muscle spasms 30 tablet 1    hydrOXYzine (ATARAX) 25 MG tablet Take 1 tablet by mouth nightly as needed for Itching 30 tablet 1    escitalopram (LEXAPRO) 20 MG tablet Take 1 tablet by mouth once daily 30 tablet 5    butalbital-acetaminophen-caffeine (FIORICET, ESGIC) -40 MG per tablet TAKE 1 TO 2 TABLETS BY MOUTH EVERY 6 HOURS AS NEEDED FOR HEADACHE 90 tablet 1    buPROPion (WELLBUTRIN XL) 150 MG extended release tablet TAKE 1 TABLET BY MOUTH ONCE DAILY IN THE MORNING 90 tablet 1    lidocaine (LMX) 4 % cream To left knee 3 times daily as needed for pain. Apply topically as needed.  120 g 1    montelukast (SINGULAIR) 10 MG tablet Take 1 tablet by mouth nightly 90 tablet 2    atorvastatin (LIPITOR) 20 MG tablet Take 1 tablet by mouth daily 90 tablet 2    acetaminophen (AMINOFEN) 325 MG tablet Take 2 tablets by mouth every 6 hours as needed for Pain 120 tablet 3    albuterol sulfate HFA (PROAIR HFA) 108 (90 Base) MCG/ACT inhaler Inhale 2 puffs into the lungs every 6 hours as needed for Wheezing 1 Inhaler 5    diclofenac sodium (VOLTAREN) 1 % GEL Apply 4 g topically 4 times daily as needed for Pain 100 g 1    albuterol (PROVENTIL) (2.5 MG/3ML) 0.083% nebulizer solution Take 3 mLs by nebulization every 6 hours as needed for Wheezing 25 vial 1    dicyclomine (BENTYL) 20 MG tablet TAKE 1 TABLET BY MOUTH THREE TIMES DAILY AS NEEDED FOR ABDOMINAL CRAMPS 60 tablet 0    sucralfate (CARAFATE) 1 GM tablet Take 1 g by mouth 2 times daily   11    dexlansoprazole (DEXILANT) 60 MG CPDR capsule Take 60 mg by mouth daily       No current facility-administered medications for this visit. No Known Allergies  Health Maintenance   Topic Date Due    HIV screen  Never done    Hepatitis C screen  Never done    Cervical cancer screen  Never done    Pneumococcal 0-64 years Vaccine (2 - PCV) 10/31/2015    Lipids  06/05/2022    Depression Monitoring  01/03/2023    DTaP/Tdap/Td vaccine (2 - Td or Tdap) 10/31/2024    Colorectal Cancer Screen  06/15/2026    Flu vaccine  Completed    COVID-19 Vaccine  Completed    Hepatitis A vaccine  Aged Out    Hepatitis B vaccine  Aged Out    Hib vaccine  Aged Out    Meningococcal (ACWY) vaccine  Aged Out         Objective:     Physical Exam  Constitutional:       General: She is not in acute distress. Appearance: She is well-developed. She is not diaphoretic. HENT:      Head: Normocephalic and atraumatic. Eyes:      General: No scleral icterus. Conjunctiva/sclera: Conjunctivae normal.   Neck:      Thyroid: No thyromegaly. Vascular: No JVD. Comments: No bruits  Cardiovascular:      Rate and Rhythm: Normal rate and regular rhythm. Heart sounds: Normal heart sounds. Pulmonary:      Effort: Pulmonary effort is normal. No respiratory distress. Breath sounds: Normal breath sounds. No wheezing or rales. Abdominal:      Palpations: Abdomen is soft. There is no mass. Tenderness: There is no abdominal tenderness. There is no guarding. Musculoskeletal:         General: No tenderness. Skin:     General: Skin is warm and dry. Findings: No rash. Neurological:      Mental Status: She is alert and oriented to person, place, and time. Cranial Nerves: No cranial nerve deficit. /88   Pulse 92   Temp 98.2 °F (36.8 °C) (Oral)   Ht 5' 7\" (1.702 m)   Wt 218 lb (98.9 kg)   SpO2 97%   BMI 34.14 kg/m²   EKG normal sinus rhythm    Impression/Plan:  1. Palpitations    2. Chest pain, unspecified type      Requested Prescriptions      No prescriptions requested or ordered in this encounter     Orders Placed This Encounter   Procedures    CBC with Auto Differential     Standing Status:   Future     Standing Expiration Date:   5/5/2023    Comprehensive Metabolic Panel     Standing Status:   Future     Standing Expiration Date:   5/5/2023    T4, Free     Standing Status:   Future     Standing Expiration Date:   5/5/2023    TSH     Standing Status:   Future     Standing Expiration Date:   5/5/2023    Holter Monitor 48 Hour     Standing Status:   Future     Standing Expiration Date:   5/5/2023     Order Specific Question:   Reason for Exam?     Answer:   Irregular heart rate    EKG 12 Lead     Order Specific Question:   Reason for Exam?     Answer:   Chest pain       Patient giveneducational materials - see patient instructions. Discussed use, benefit, and side effects of prescribed medications. All patient questions answered. Pt voiced understanding. Reviewed health maintenance. Patient agreedwith treatment plan. Follow up as directed. **This report has been created using voice recognition software. It may contain minor errorswhich are inherent in voice recognition technology. **       Electronically signed by Neri Edwards MD on 5/7/2022 at 12:09 PM

## 2022-05-09 ENCOUNTER — HOSPITAL ENCOUNTER (OUTPATIENT)
Dept: NON INVASIVE DIAGNOSTICS | Age: 47
Discharge: HOME | End: 2022-05-09
Payer: COMMERCIAL

## 2022-05-09 DIAGNOSIS — R00.2 PALPITATIONS: ICD-10-CM

## 2022-05-09 PROCEDURE — 93225 XTRNL ECG REC<48 HRS REC: CPT

## 2022-05-09 PROCEDURE — 93226 XTRNL ECG REC<48 HR SCAN A/R: CPT

## 2022-05-12 LAB
ACQUISITION DURATION: NORMAL S
AVERAGE HEART RATE: 85 BPM
HOOKUP DATE: NORMAL
HOOKUP TIME: NORMAL
MAX HEART RATE TIME/DATE: NORMAL
MAX HEART RATE: 138 BPM
MIN HEART RATE TIME/DATE: NORMAL
MIN HEART RATE: 58 BPM
NUMBER OF QRS COMPLEXES: NORMAL
NUMBER OF SUPRAVENTRICULAR COUPLETS: 0
NUMBER OF SUPRAVENTRICULAR ECTOPICS: 1
NUMBER OF SUPRAVENTRICULAR ISOLATED BEATS: 1
NUMBER OF VENTRICULAR BIGEMINAL CYCLES: 0
NUMBER OF VENTRICULAR COUPLETS: 0
NUMBER OF VENTRICULAR ECTOPICS: 0

## 2022-05-12 RX ORDER — BUTALBITAL, ACETAMINOPHEN AND CAFFEINE 50; 325; 40 MG/1; MG/1; MG/1
TABLET ORAL
Qty: 90 TABLET | Refills: 1 | Status: SHIPPED | OUTPATIENT
Start: 2022-05-12 | End: 2022-06-08 | Stop reason: SDUPTHER

## 2022-05-18 LAB
ABSOLUTE BASO #: 0.1 X10E9/L (ref 0–0.2)
ABSOLUTE EOS #: 0.2 X10E9/L (ref 0–0.4)
ABSOLUTE LYMPH #: 2.1 X10E9/L (ref 1–3.5)
ABSOLUTE MONO #: 0.5 X10E9/L (ref 0–0.9)
ABSOLUTE NEUT #: 7.3 X10E9/L (ref 1.5–6.6)
ALBUMIN SERPL-MCNC: 4.1 G/DL (ref 3.2–5.3)
ALK PHOSPHATASE: 151 U/L (ref 39–130)
ALT SERPL-CCNC: 12 U/L (ref 0–31)
ANION GAP SERPL CALCULATED.3IONS-SCNC: 10 MMOL/L (ref 5–15)
AST SERPL-CCNC: 13 U/L (ref 0–41)
BASOPHILS RELATIVE PERCENT: 0.5 %
BILIRUB SERPL-MCNC: 0.2 MG/DL (ref 0.3–1.2)
BUN BLDV-MCNC: 8 MG/DL (ref 5–23)
CALCIUM SERPL-MCNC: 9.1 MG/DL (ref 8.5–10.5)
CHLORIDE BLD-SCNC: 105 MMOL/L (ref 98–109)
CO2: 25 MMOL/L (ref 22–32)
CREAT SERPL-MCNC: 0.63 MG/DL (ref 0.4–1)
EGFR AFRICAN AMERICAN: >60 ML/MIN/1.73SQ.M
EGFR IF NONAFRICAN AMERICAN: >60 ML/MIN/1.73SQ.M
EOSINOPHILS RELATIVE PERCENT: 1.9 %
GLUCOSE: 72 MG/DL (ref 65–99)
HCT VFR BLD CALC: 39.6 % (ref 35–47)
HEMOGLOBIN: 13.2 G/DL (ref 11.7–15.5)
LYMPHOCYTE %: 20.7 %
MCH RBC QN AUTO: 29.2 PG (ref 27–34)
MCHC RBC AUTO-ENTMCNC: 33.3 G/DL (ref 32–36)
MCV RBC AUTO: 88 FL (ref 80–100)
MONOCYTES # BLD: 5.2 %
NEUTROPHILS RELATIVE PERCENT: 71.7 %
PDW BLD-RTO: 13.7 % (ref 11.5–15)
PLATELETS: 390 X10E9/L (ref 150–450)
PMV BLD AUTO: 8.3 FL (ref 7–12)
POTASSIUM SERPL-SCNC: 4.3 MMOL/L (ref 3.5–5)
RBC: 4.53 X10E12/L (ref 3.8–5.2)
SODIUM BLD-SCNC: 140 MMOL/L (ref 134–146)
T4 FREE: 0.73 NG/DL (ref 0.61–1.6)
TOTAL PROTEIN: 7 G/DL (ref 6–8)
TSH SERPL DL<=0.05 MIU/L-ACNC: 2.27 UIU/ML (ref 0.49–4.67)
WBC: 10.1 X10E9/L (ref 4–11)

## 2022-06-08 RX ORDER — BUTALBITAL, ACETAMINOPHEN AND CAFFEINE 50; 325; 40 MG/1; MG/1; MG/1
TABLET ORAL
Qty: 90 TABLET | Refills: 1 | Status: SHIPPED | OUTPATIENT
Start: 2022-06-08 | End: 2022-08-04 | Stop reason: SDUPTHER

## 2022-06-15 RX ORDER — ATORVASTATIN CALCIUM 20 MG/1
TABLET, FILM COATED ORAL
Qty: 30 TABLET | Refills: 5 | Status: SHIPPED | OUTPATIENT
Start: 2022-06-15

## 2022-07-19 ENCOUNTER — HOSPITAL ENCOUNTER (OUTPATIENT)
Dept: NUCLEAR MEDICINE | Age: 47
Discharge: HOME | End: 2022-07-19
Payer: COMMERCIAL

## 2022-07-19 DIAGNOSIS — R10.13 EPIGASTRIC PAIN: ICD-10-CM

## 2022-07-19 DIAGNOSIS — R11.0 NAUSEA: ICD-10-CM

## 2022-07-19 PROCEDURE — A9541 TC99M SULFUR COLLOID: HCPCS | Performed by: NURSE PRACTITIONER

## 2022-07-19 PROCEDURE — 78264 GASTRIC EMPTYING IMG STUDY: CPT

## 2022-07-19 PROCEDURE — 3430000000 HC RX DIAGNOSTIC RADIOPHARMACEUTICAL: Performed by: NURSE PRACTITIONER

## 2022-07-19 RX ADMIN — Medication 1 MILLICURIE: at 08:29

## 2022-07-28 RX ORDER — HYDROXYZINE HYDROCHLORIDE 25 MG/1
25 TABLET, FILM COATED ORAL NIGHTLY PRN
Qty: 30 TABLET | Refills: 0 | Status: SHIPPED | OUTPATIENT
Start: 2022-07-28 | End: 2022-10-13 | Stop reason: SDUPTHER

## 2022-08-04 RX ORDER — BUTALBITAL, ACETAMINOPHEN AND CAFFEINE 50; 325; 40 MG/1; MG/1; MG/1
TABLET ORAL
Qty: 90 TABLET | Refills: 1 | Status: SHIPPED | OUTPATIENT
Start: 2022-08-04 | End: 2022-09-09 | Stop reason: SDUPTHER

## 2022-08-22 RX ORDER — CYCLOBENZAPRINE HCL 10 MG
10 TABLET ORAL EVERY 8 HOURS PRN
Qty: 30 TABLET | Refills: 1 | Status: SHIPPED | OUTPATIENT
Start: 2022-08-22

## 2022-09-12 RX ORDER — BUTALBITAL, ACETAMINOPHEN AND CAFFEINE 50; 325; 40 MG/1; MG/1; MG/1
TABLET ORAL
Qty: 90 TABLET | Refills: 1 | Status: SHIPPED | OUTPATIENT
Start: 2022-09-12 | End: 2022-10-13 | Stop reason: SDUPTHER

## 2022-09-16 RX ORDER — MONTELUKAST SODIUM 10 MG/1
10 TABLET ORAL NIGHTLY
Qty: 90 TABLET | Refills: 1 | Status: SHIPPED | OUTPATIENT
Start: 2022-09-16

## 2022-10-05 DIAGNOSIS — F32.A DEPRESSION, UNSPECIFIED DEPRESSION TYPE: ICD-10-CM

## 2022-10-06 RX ORDER — ESCITALOPRAM OXALATE 20 MG/1
20 TABLET ORAL DAILY
Qty: 90 TABLET | Refills: 1 | Status: SHIPPED | OUTPATIENT
Start: 2022-10-06

## 2022-10-13 RX ORDER — HYDROXYZINE HYDROCHLORIDE 25 MG/1
25 TABLET, FILM COATED ORAL NIGHTLY PRN
Qty: 30 TABLET | Refills: 2 | Status: SHIPPED | OUTPATIENT
Start: 2022-10-13

## 2022-10-13 RX ORDER — BUTALBITAL, ACETAMINOPHEN AND CAFFEINE 50; 325; 40 MG/1; MG/1; MG/1
TABLET ORAL
Qty: 90 TABLET | Refills: 1 | Status: SHIPPED | OUTPATIENT
Start: 2022-10-13

## 2022-11-01 ENCOUNTER — APPOINTMENT (OUTPATIENT)
Dept: GENERAL RADIOLOGY | Age: 47
End: 2022-11-01
Payer: COMMERCIAL

## 2022-11-01 ENCOUNTER — HOSPITAL ENCOUNTER (EMERGENCY)
Age: 47
Discharge: HOME | End: 2022-11-01
Attending: EMERGENCY MEDICINE
Payer: COMMERCIAL

## 2022-11-01 VITALS
HEART RATE: 91 BPM | OXYGEN SATURATION: 100 % | SYSTOLIC BLOOD PRESSURE: 135 MMHG | TEMPERATURE: 98 F | RESPIRATION RATE: 20 BRPM | DIASTOLIC BLOOD PRESSURE: 69 MMHG

## 2022-11-01 DIAGNOSIS — S93.401A SPRAIN OF RIGHT ANKLE, UNSPECIFIED LIGAMENT, INITIAL ENCOUNTER: Primary | ICD-10-CM

## 2022-11-01 PROCEDURE — 99213 OFFICE O/P EST LOW 20 MIN: CPT

## 2022-11-01 PROCEDURE — G0463 HOSPITAL OUTPT CLINIC VISIT: HCPCS

## 2022-11-01 PROCEDURE — 99213 OFFICE O/P EST LOW 20 MIN: CPT | Performed by: EMERGENCY MEDICINE

## 2022-11-01 PROCEDURE — 73610 X-RAY EXAM OF ANKLE: CPT

## 2022-11-01 NOTE — ED PROVIDER NOTES
[facility]  Urgent Care Encounter      CHIEF COMPLAINT       Chief Complaint   Patient presents with    Ankle Pain     right       Nurses Notes reviewed and I agree except as noted in the HPI. HISTORY OF PRESENT ILLNESS   Roxane Manuel is a 41 y.o. female who presents for acute right ankle pain that started 3 hours ago after spraining it. Reports significant pain and swelling. Unable to bear weight. REVIEW OF SYSTEMS     Review of Systems   Musculoskeletal:  Positive for arthralgias and joint swelling. Right ankle     PAST MEDICAL HISTORY         Diagnosis Date    Anxiety     Arthritis     Asthma     Depression     Diverticulitis     GERD (gastroesophageal reflux disease)     Hiatal hernia     Hyperlipidemia     Migraines     Obesity        SURGICAL HISTORY     Patient  has a past surgical history that includes knee surgery (1990); laparoscopy (1997); Ureter stent placement (2004); Cholecystectomy, laparoscopic (3/4/13); and Dilation and curettage of uterus (N/A, 3/15/2021).     CURRENT MEDICATIONS       Discharge Medication List as of 11/1/2022 12:01 PM        CONTINUE these medications which have NOT CHANGED    Details   butalbital-acetaminophen-caffeine (FIORICET, ESGIC) -40 MG per tablet Take 1 to 2 tablets by  Mouth every 6 hours as needed for headache, Disp-90 tablet, R-1Normal      hydrOXYzine HCl (ATARAX) 25 MG tablet Take 1 tablet by mouth nightly as needed for Itching, Disp-30 tablet, R-2Normal      escitalopram (LEXAPRO) 20 MG tablet Take 1 tablet by mouth daily Take 1 tablet by mouth once daily, Disp-90 tablet, R-1Normal      montelukast (SINGULAIR) 10 MG tablet Take 1 tablet by mouth nightly, Disp-90 tablet, R-1Normal      cyclobenzaprine (FLEXERIL) 10 MG tablet Take 1 tablet by mouth every 8 hours as needed for Muscle spasms, Disp-30 tablet, R-1Normal      atorvastatin (LIPITOR) 20 MG tablet Take 1 tablet by mouth once daily, Disp-30 tablet, R-5Normal buPROPion (WELLBUTRIN XL) 150 MG extended release tablet TAKE 1 TABLET BY MOUTH ONCE DAILY IN THE MORNING, Disp-90 tablet, R-1Normal      lidocaine (LMX) 4 % cream To left knee 3 times daily as needed for pain. Apply topically as needed. , Disp-120 g, R-1, Normal      acetaminophen (AMINOFEN) 325 MG tablet Take 2 tablets by mouth every 6 hours as needed for Pain, Disp-120 tablet, R-3OTC      albuterol sulfate HFA (PROAIR HFA) 108 (90 Base) MCG/ACT inhaler Inhale 2 puffs into the lungs every 6 hours as needed for Wheezing, Disp-1 Inhaler, R-5Normal      diclofenac sodium (VOLTAREN) 1 % GEL Apply 4 g topically 4 times daily as needed for Pain, Topical, 4 TIMES DAILY PRN Starting Tue 12/22/2020, Disp-100 g, R-1, Normal      albuterol (PROVENTIL) (2.5 MG/3ML) 0.083% nebulizer solution Take 3 mLs by nebulization every 6 hours as needed for Wheezing, Disp-25 vial, R-1Normal      dicyclomine (BENTYL) 20 MG tablet TAKE 1 TABLET BY MOUTH THREE TIMES DAILY AS NEEDED FOR ABDOMINAL CRAMPS, Disp-60 tablet, R-0      sucralfate (CARAFATE) 1 GM tablet Take 1 g by mouth 2 times daily , R-11Historical Med      dexlansoprazole (DEXILANT) 60 MG CPDR capsule Take 60 mg by mouth daily             ALLERGIES     Patient is has No Known Allergies. FAMILY HISTORY     Patient'sfamily history includes Asthma in her paternal grandfather; COPD in her father and paternal grandfather; Diabetes in her mother and sister; Emphysema in her paternal grandfather; Glaucoma in her maternal grandmother; Heart Disease in her father; High Blood Pressure in her maternal grandmother; High Cholesterol in her mother and paternal grandmother; Other in her paternal grandmother; Stroke in her maternal grandfather. SOCIAL HISTORY     Patient  reports that she has never smoked. She has never used smokeless tobacco. She reports that she does not drink alcohol and does not use drugs.     PHYSICAL EXAM     ED TRIAGE VITALS  BP: 135/69, Temp: 98 °F (36.7 °C), Heart Rate: 91, Resp: 20, SpO2: 100 %  Physical Exam  Vitals reviewed. Constitutional:       Appearance: Normal appearance. HENT:      Head: Normocephalic and atraumatic. Eyes:      General: No scleral icterus. Conjunctiva/sclera: Conjunctivae normal.   Cardiovascular:      Rate and Rhythm: Normal rate and regular rhythm. Pulses: Normal pulses. Heart sounds: Normal heart sounds. Pulmonary:      Effort: No respiratory distress. Breath sounds: Normal breath sounds. Chest:      Chest wall: No tenderness. Abdominal:      General: Abdomen is flat. Bowel sounds are normal. There is no distension. Palpations: Abdomen is soft. Tenderness: There is no abdominal tenderness. Musculoskeletal:         General: Swelling present. Comments: Of the right ankle. No bruising or erythema noted. Skin:     General: Skin is warm and dry. Capillary Refill: Capillary refill takes less than 2 seconds. Coloration: Skin is not jaundiced. Neurological:      General: No focal deficit present. Mental Status: She is alert and oriented to person, place, and time. Psychiatric:         Mood and Affect: Mood normal.         Behavior: Behavior normal.       DIAGNOSTIC RESULTS   Labs:No results found for this visit on 11/01/22. IMAGING:  XR ANKLE RIGHT (MIN 3 VIEWS)   Final Result         1. No acute fracture. 2. Mild degenerative change. 3. Tiny plantar spur. **This report has been created using voice recognition software. It may contain minor errors which are inherent in voice recognition technology. **      Final report electronically signed by DR Camilo Jeffrey on 11/1/2022 11:26 AM         URGENT CARE COURSE:     Vitals:    11/01/22 1113   BP: 135/69   Pulse: 91   Resp: 20   Temp: 98 °F (36.7 °C)   SpO2: 100%       Medications - No data to display  PROCEDURES:  None  FINAL IMPRESSION      1.  Sprain of right ankle, unspecified ligament, initial encounter DISPOSITION/PLAN   DISPOSITION Decision To Discharge 11/01/2022 11:41:50 AM    No acute fractures on right ankle xrays. Patient made aware of small plantar spur incidentally seen. RICE supportive therapy. Nsaids/Tylenol as needed. Recommend physical therapy after acute injury period. Patient to use knee walker/scooter.       PATIENT REFERRED TO:  Sharri Robles MD  [address] [facility] [address]  [phone]    Go in 2 weeks  As needed, If symptoms worsen  DISCHARGE MEDICATIONS:  Discharge Medication List as of 11/1/2022 12:01 PM        Discharge Medication List as of 11/1/2022 12:01 PM          MD Guanakito Moses MD  Resident  11/01/22 2012

## 2022-11-01 NOTE — DISCHARGE INSTRUCTIONS
May try [facility] if walking boot too expensive through [employer]. Nsaids and tylenol as needed. Lidocaine patches or diclofenac gel as needed (over the counter). Gabapentin alternative is lyrica (pregabalin) as discussed with your .

## 2022-11-01 NOTE — ED PROVIDER NOTES
[address]       Chief Complaint   Patient presents with    Ankle Pain     right       Nurses Notes reviewed and I agree except as noted in the HPI. HISTORY OF PRESENT ILLNESS   Rafael Nguyen is a 41 y.o. female who presents with right ankle injury      I, Alma Garcia MD,  personally performed and participated in key or critical portions of the evaluation and management including personally performing the exam and medical decision making. I verify the accuracy of the documentation by the resident.   Please review resident note for specifics and further details of this urgent care evaluation    Electronically signed by Jh Dietrich MD on 11/1/2022 at 11:16 AM       Jh Dietrich MD  11/01/22 1126

## 2022-11-08 ENCOUNTER — NURSE TRIAGE (OUTPATIENT)
Dept: OTHER | Facility: CLINIC | Age: 47
End: 2022-11-08

## 2022-11-08 ENCOUNTER — OFFICE VISIT (OUTPATIENT)
Dept: FAMILY MEDICINE CLINIC | Age: 47
End: 2022-11-08
Payer: COMMERCIAL

## 2022-11-08 VITALS
DIASTOLIC BLOOD PRESSURE: 64 MMHG | TEMPERATURE: 98 F | RESPIRATION RATE: 14 BRPM | BODY MASS INDEX: 34.19 KG/M2 | SYSTOLIC BLOOD PRESSURE: 110 MMHG | WEIGHT: 218.3 LBS | OXYGEN SATURATION: 98 % | HEART RATE: 76 BPM

## 2022-11-08 DIAGNOSIS — S93.401D SPRAIN OF RIGHT ANKLE, UNSPECIFIED LIGAMENT, SUBSEQUENT ENCOUNTER: Primary | ICD-10-CM

## 2022-11-08 PROCEDURE — 1036F TOBACCO NON-USER: CPT | Performed by: FAMILY MEDICINE

## 2022-11-08 PROCEDURE — G8427 DOCREV CUR MEDS BY ELIG CLIN: HCPCS | Performed by: FAMILY MEDICINE

## 2022-11-08 PROCEDURE — G8417 CALC BMI ABV UP PARAM F/U: HCPCS | Performed by: FAMILY MEDICINE

## 2022-11-08 PROCEDURE — 99213 OFFICE O/P EST LOW 20 MIN: CPT | Performed by: FAMILY MEDICINE

## 2022-11-08 PROCEDURE — G8484 FLU IMMUNIZE NO ADMIN: HCPCS | Performed by: FAMILY MEDICINE

## 2022-11-08 RX ORDER — FAMOTIDINE 40 MG/1
40 TABLET, FILM COATED ORAL DAILY
COMMUNITY

## 2022-11-08 NOTE — TELEPHONE ENCOUNTER
Limited triage due to adult not being present during call. Location of patient: [place]    Received call from [facility] at [facility] with Red Flag Complaint. Subjective: Caller states \"Ankle swelling\"     Current Symptoms: Tripped last week and RT twisted ankle. Seen [facility], dx high ankle sprain. Onset: 1 week ago; worsening    Associated Symptoms: NA    Pain Severity: throbbing; intermittent    Temperature: denies fever    What has been tried: elevation, ice, ibuprofen    LMP: NA Pregnant: NA    Recommended disposition: See in Office Today or Tomorrow    Care advice provided, patient verbalizes understanding; denies any other questions or concerns; instructed to call back for any new or worsening symptoms. Patient/Caller agrees with recommended disposition; writer provided warm transfer to [facility] at [facility] for appointment scheduling    Attention Provider: Thank you for allowing me to participate in the care of your patient. The patient was connected to triage in response to information provided to the ECC/PSC. Please do not respond through this encounter as the response is not directed to a shared pool. Reason for Disposition   Limp when walking    Protocols used:  Ankle and Foot Injury-ADULT-[place]

## 2022-11-13 ASSESSMENT — ENCOUNTER SYMPTOMS
RHINORRHEA: 0
ABDOMINAL PAIN: 0
EYES NEGATIVE: 1
SORE THROAT: 0
NAUSEA: 0
COUGH: 0
BACK PAIN: 0
VOMITING: 0
CHEST TIGHTNESS: 0
SHORTNESS OF BREATH: 0
DIARRHEA: 0

## 2022-11-13 NOTE — PROGRESS NOTES
[address] [place] [facility] [address]  Dept: [phone]  Dept Fax: [phone]  Loc: [phone]  PROGRESS NOTE      VisitDate: 11/8/2022    Marsha Bryson is a 41 y.o. female who presents today for:     Chief Complaint   Patient presents with    Ankle Injury     Right ankle, fell at work 1 week ago, still painful, and tingling. Subjective:  HPI  Is in for ER follow-up of ankle sprain. She had a eversion injury to her right ankle. She was placed in a splint through the ER and x-rays are negative ER records reviewed. Patient still experiencing discomfort when walking    Review of Systems   Constitutional:  Negative for activity change, appetite change, fatigue and fever. HENT:  Negative for congestion, rhinorrhea and sore throat. Eyes: Negative. Respiratory:  Negative for cough, chest tightness and shortness of breath. Cardiovascular:  Negative for chest pain and palpitations. Gastrointestinal:  Negative for abdominal pain, diarrhea, nausea and vomiting. Genitourinary:  Negative for dysuria and urgency. Musculoskeletal:  Positive for arthralgias and gait problem. Negative for back pain. Neurological:  Negative for dizziness and headaches. Psychiatric/Behavioral:  Negative for dysphoric mood. The patient is not nervous/anxious.     Past Medical History:   Diagnosis Date    Anxiety     Arthritis     Asthma     Depression     Diverticulitis     GERD (gastroesophageal reflux disease)     Hiatal hernia     Hyperlipidemia     Migraines     Obesity       Past Surgical History:   Procedure Laterality Date    CHOLECYSTECTOMY, LAPAROSCOPIC  3/4/13    Dr. Chris Rivera N/A 3/15/2021    DILATATION AND CURETTAGE HYSTEROSCOPY  [facility] performed by Amber Angel MD at [address]    right     [address] [facility]    mamta- Marita Holter  2004    Dr. Karina Reeder     Family History   Problem Relation Age of Onset    Heart Disease Father     COPD Father     Diabetes Sister     COPD Paternal Grandfather     Emphysema Paternal Grandfather     Asthma Paternal Grandfather     Diabetes Mother     High Cholesterol Mother     High Blood Pressure Maternal Grandmother     Glaucoma Maternal Grandmother     Stroke Maternal Grandfather     High Cholesterol Paternal Grandmother     Other Paternal Grandmother         begnin brain tumor     Social History     Tobacco Use    Smoking status: Never    Smokeless tobacco: Never   Substance Use Topics    Alcohol use: No     Alcohol/week: 0.0 standard drinks     Comment: rarely      Current Outpatient Medications   Medication Sig Dispense Refill    famotidine (PEPCID) 40 MG tablet Take 40 mg by mouth daily      butalbital-acetaminophen-caffeine (FIORICET, ESGIC) -40 MG per tablet Take 1 to 2 tablets by  Mouth every 6 hours as needed for headache 90 tablet 1    hydrOXYzine HCl (ATARAX) 25 MG tablet Take 1 tablet by mouth nightly as needed for Itching 30 tablet 2    escitalopram (LEXAPRO) 20 MG tablet Take 1 tablet by mouth daily Take 1 tablet by mouth once daily 90 tablet 1    montelukast (SINGULAIR) 10 MG tablet Take 1 tablet by mouth nightly 90 tablet 1    cyclobenzaprine (FLEXERIL) 10 MG tablet Take 1 tablet by mouth every 8 hours as needed for Muscle spasms 30 tablet 1    atorvastatin (LIPITOR) 20 MG tablet Take 1 tablet by mouth once daily 30 tablet 5    buPROPion (WELLBUTRIN XL) 150 MG extended release tablet TAKE 1 TABLET BY MOUTH ONCE DAILY IN THE MORNING 90 tablet 1    lidocaine (LMX) 4 % cream To left knee 3 times daily as needed for pain. Apply topically as needed.  120 g 1    acetaminophen (AMINOFEN) 325 MG tablet Take 2 tablets by mouth every 6 hours as needed for Pain 120 tablet 3    albuterol sulfate HFA (PROAIR HFA) 108 (90 Base) MCG/ACT inhaler Inhale 2 puffs into the lungs every 6 hours as needed for Wheezing 1 Inhaler 5    albuterol (PROVENTIL) (2.5 MG/3ML) 0.083% nebulizer solution Take 3 mLs by nebulization every 6 hours as needed for Wheezing 25 vial 1    dicyclomine (BENTYL) 20 MG tablet TAKE 1 TABLET BY MOUTH THREE TIMES DAILY AS NEEDED FOR ABDOMINAL CRAMPS 60 tablet 0    sucralfate (CARAFATE) 1 GM tablet Take 1 g by mouth 2 times daily   11    dexlansoprazole (DEXILANT) 60 MG CPDR delayed release capsule Take 60 mg by mouth daily      diclofenac sodium (VOLTAREN) 1 % GEL Apply 4 g topically 4 times daily as needed for Pain 100 g 1     No current facility-administered medications for this visit. No Known Allergies  Health Maintenance   Topic Date Due    HIV screen  Never done    Hepatitis C screen  Never done    Cervical cancer screen  Never done    COVID-19 Vaccine (4 - Booster for Moderna series) 02/24/2022    Lipids  06/05/2022    Depression Monitoring  01/03/2023    DTaP/Tdap/Td vaccine (2 - Td or Tdap) 10/31/2024    Colorectal Cancer Screen  06/15/2026    Flu vaccine  Completed    Pneumococcal 0-64 years Vaccine  Completed    Hepatitis A vaccine  Aged Out    Hib vaccine  Aged Out    Meningococcal (ACWY) vaccine  Aged Out         Objective:     Physical Exam  Constitutional:       General: She is not in acute distress. Appearance: Normal appearance. She is well-developed. She is not diaphoretic. HENT:      Head: Normocephalic and atraumatic. Right Ear: External ear normal.      Left Ear: External ear normal.      Nose: Nose normal.      Mouth/Throat:      Pharynx: Oropharynx is clear. Eyes:      General: No scleral icterus. Conjunctiva/sclera: Conjunctivae normal.   Neck:      Thyroid: No thyromegaly. Vascular: No JVD. Comments: No bruits  Cardiovascular:      Rate and Rhythm: Normal rate and regular rhythm. Heart sounds: Normal heart sounds. Pulmonary:      Effort: Pulmonary effort is normal. No respiratory distress.       Breath sounds: Normal breath sounds. No wheezing or rales. Abdominal:      Palpations: Abdomen is soft. There is no mass. Tenderness: There is no abdominal tenderness. There is no guarding. Musculoskeletal:         General: Swelling and tenderness present. Comments: Ankle swelling and lateral tenderness there is old ecchymosis at the base of the foot   Skin:     General: Skin is warm and dry. Findings: No rash. Neurological:      Mental Status: She is alert and oriented to person, place, and time. Mental status is at baseline. Cranial Nerves: No cranial nerve deficit. Psychiatric:         Mood and Affect: Mood normal.     /64   Pulse 76   Temp 98 °F (36.7 °C)   Resp 14   Wt 218 lb 4.8 oz (99 kg)   SpO2 98%   BMI 34.19 kg/m²       Impression/Plan:  1. Sprain of right ankle, unspecified ligament, subsequent encounter      Requested Prescriptions      No prescriptions requested or ordered in this encounter     No orders of the defined types were placed in this encounter. Rest, ice, compression, elevation when not on her feet. Patient giveneducational materials - see patient instructions. Discussed use, benefit, and side effects of prescribed medications. All patient questions answered. Pt voiced understanding. Reviewed health maintenance. Patient agreedwith treatment plan. Follow up as directed. **This report has been created using voice recognition software. It may contain minor errorswhich are inherent in voice recognition technology. **       Electronically signed by Cody Man MD on 11/13/2022 at 12:58 PM

## 2022-12-07 ENCOUNTER — OFFICE VISIT (OUTPATIENT)
Dept: FAMILY MEDICINE CLINIC | Age: 47
End: 2022-12-07
Payer: COMMERCIAL

## 2022-12-07 VITALS
SYSTOLIC BLOOD PRESSURE: 102 MMHG | OXYGEN SATURATION: 98 % | TEMPERATURE: 99.1 F | BODY MASS INDEX: 34.14 KG/M2 | WEIGHT: 218 LBS | RESPIRATION RATE: 16 BRPM | DIASTOLIC BLOOD PRESSURE: 60 MMHG | HEART RATE: 88 BPM

## 2022-12-07 DIAGNOSIS — M25.571 ACUTE RIGHT ANKLE PAIN: Primary | ICD-10-CM

## 2022-12-07 PROCEDURE — G8417 CALC BMI ABV UP PARAM F/U: HCPCS | Performed by: FAMILY MEDICINE

## 2022-12-07 PROCEDURE — 99213 OFFICE O/P EST LOW 20 MIN: CPT | Performed by: FAMILY MEDICINE

## 2022-12-07 PROCEDURE — G8427 DOCREV CUR MEDS BY ELIG CLIN: HCPCS | Performed by: FAMILY MEDICINE

## 2022-12-07 PROCEDURE — G8484 FLU IMMUNIZE NO ADMIN: HCPCS | Performed by: FAMILY MEDICINE

## 2022-12-07 PROCEDURE — 1036F TOBACCO NON-USER: CPT | Performed by: FAMILY MEDICINE

## 2022-12-11 ASSESSMENT — ENCOUNTER SYMPTOMS
SHORTNESS OF BREATH: 0
SORE THROAT: 0
NAUSEA: 0
DIARRHEA: 0
VOMITING: 0
EYES NEGATIVE: 1
ABDOMINAL PAIN: 0
CHEST TIGHTNESS: 0
COUGH: 0
RHINORRHEA: 0
BACK PAIN: 0

## 2022-12-11 NOTE — PROGRESS NOTES
[address] [facility] [address]  Dept: [phone]  Dept Fax: [phone]  Loc: [phone]  PROGRESS NOTE      VisitDate: 12/7/2022    Suraj Vincent is a 41 y.o. female who presents today for:     Chief Complaint   Patient presents with    Ankle Pain     Right ankle pain and still swollen some. Subjective:  HPI  Comes in follow-up right-sided ankle pain. She had an injury over a month ago and does not feel any improvement. X-rays were negative for fracture. Still has discomfort with ambulation and weightbearing    Review of Systems   Constitutional:  Negative for activity change, appetite change, fatigue and fever. HENT:  Negative for congestion, rhinorrhea and sore throat. Eyes: Negative. Respiratory:  Negative for cough, chest tightness and shortness of breath. Cardiovascular:  Negative for chest pain and palpitations. Gastrointestinal:  Negative for abdominal pain, diarrhea, nausea and vomiting. Genitourinary:  Negative for dysuria and urgency. Musculoskeletal:  Positive for arthralgias and gait problem. Negative for back pain. Neurological:  Negative for dizziness and headaches. Psychiatric/Behavioral:  Negative for dysphoric mood. The patient is not nervous/anxious.     Past Medical History:   Diagnosis Date    Anxiety     Arthritis     Asthma     Depression     Diverticulitis     GERD (gastroesophageal reflux disease)     Hiatal hernia     Hyperlipidemia     Migraines     Obesity       Past Surgical History:   Procedure Laterality Date    CHOLECYSTECTOMY, LAPAROSCOPIC  3/4/13    Dr. Jewel Castano N/A 3/15/2021    DILATATION AND CURETTAGE HYSTEROSCOPY  [facility] performed by Sindy Huang MD at [address]    right     LAPAROSCOPY  1997    exploratory-Dr. Nimco Zurita  2004     [employer]     Family History   Problem Relation Age of Onset    Heart Disease Father     COPD Father     Diabetes Sister     COPD Paternal Grandfather     Emphysema Paternal Grandfather     Asthma Paternal Grandfather     Diabetes Mother     High Cholesterol Mother     High Blood Pressure Maternal Grandmother     Glaucoma Maternal Grandmother     Stroke Maternal Grandfather     High Cholesterol Paternal Grandmother     Other Paternal Grandmother         begnin brain tumor     Social History     Tobacco Use    Smoking status: Never    Smokeless tobacco: Never   Substance Use Topics    Alcohol use: No     Alcohol/week: 0.0 standard drinks     Comment: rarely      Current Outpatient Medications   Medication Sig Dispense Refill    famotidine (PEPCID) 40 MG tablet Take 40 mg by mouth daily      diclofenac sodium (VOLTAREN) 1 % GEL Apply 4 g topically 4 times daily as needed for Pain 100 g 1    butalbital-acetaminophen-caffeine (FIORICET, ESGIC) -40 MG per tablet Take 1 to 2 tablets by  Mouth every 6 hours as needed for headache 90 tablet 1    hydrOXYzine HCl (ATARAX) 25 MG tablet Take 1 tablet by mouth nightly as needed for Itching 30 tablet 2    escitalopram (LEXAPRO) 20 MG tablet Take 1 tablet by mouth daily Take 1 tablet by mouth once daily 90 tablet 1    montelukast (SINGULAIR) 10 MG tablet Take 1 tablet by mouth nightly 90 tablet 1    cyclobenzaprine (FLEXERIL) 10 MG tablet Take 1 tablet by mouth every 8 hours as needed for Muscle spasms 30 tablet 1    atorvastatin (LIPITOR) 20 MG tablet Take 1 tablet by mouth once daily 30 tablet 5    buPROPion (WELLBUTRIN XL) 150 MG extended release tablet TAKE 1 TABLET BY MOUTH ONCE DAILY IN THE MORNING 90 tablet 1    lidocaine (LMX) 4 % cream To left knee 3 times daily as needed for pain. Apply topically as needed.  120 g 1    acetaminophen (AMINOFEN) 325 MG tablet Take 2 tablets by mouth every 6 hours as needed for Pain 120 tablet 3    albuterol sulfate HFA (PROAIR HFA) 108 (90 Base) MCG/ACT inhaler Inhale 2 puffs into the lungs every 6 hours as needed for Wheezing 1 Inhaler 5    albuterol (PROVENTIL) (2.5 MG/3ML) 0.083% nebulizer solution Take 3 mLs by nebulization every 6 hours as needed for Wheezing 25 vial 1    dicyclomine (BENTYL) 20 MG tablet TAKE 1 TABLET BY MOUTH THREE TIMES DAILY AS NEEDED FOR ABDOMINAL CRAMPS 60 tablet 0    sucralfate (CARAFATE) 1 GM tablet Take 1 g by mouth 2 times daily   11    dexlansoprazole (DEXILANT) 60 MG CPDR delayed release capsule Take 60 mg by mouth daily       No current facility-administered medications for this visit. No Known Allergies  Health Maintenance   Topic Date Due    HIV screen  Never done    Hepatitis C screen  Never done    Cervical cancer screen  Never done    COVID-19 Vaccine (4 - Booster for Moderna series) 02/24/2022    Lipids  06/05/2022    Depression Monitoring  01/03/2023    DTaP/Tdap/Td vaccine (2 - Td or Tdap) 10/31/2024    Colorectal Cancer Screen  06/15/2026    Flu vaccine  Completed    Pneumococcal 0-64 years Vaccine  Completed    Hepatitis A vaccine  Aged Out    Hib vaccine  Aged Out    Meningococcal (ACWY) vaccine  Aged Out         Objective:     Physical Exam  Constitutional:       General: She is not in acute distress. Appearance: Normal appearance. She is well-developed. She is not diaphoretic. HENT:      Head: Normocephalic and atraumatic. Right Ear: External ear normal.      Left Ear: External ear normal.      Nose: Nose normal.      Mouth/Throat:      Pharynx: Oropharynx is clear. Eyes:      General: No scleral icterus. Conjunctiva/sclera: Conjunctivae normal.   Neck:      Thyroid: No thyromegaly. Vascular: No JVD. Comments: No bruits  Cardiovascular:      Rate and Rhythm: Normal rate and regular rhythm. Heart sounds: Normal heart sounds. Pulmonary:      Effort: Pulmonary effort is normal. No respiratory distress. Breath sounds: Normal breath sounds. No wheezing or rales. Abdominal:      Palpations: Abdomen is soft. There is no mass. Tenderness: There is no abdominal tenderness. There is no guarding. Musculoskeletal:         General: Tenderness present. Comments: Right ankle tenderness with slight effusion no instability good pedal pulses sensations intact in the lower extremity   Skin:     General: Skin is warm and dry. Findings: No rash. Neurological:      Mental Status: She is alert and oriented to person, place, and time. Mental status is at baseline. Cranial Nerves: No cranial nerve deficit. Psychiatric:         Mood and Affect: Mood normal.     /60   Pulse 88   Temp 99.1 °F (37.3 °C) (Oral)   Resp 16   Wt 218 lb (98.9 kg)   SpO2 98%   BMI 34.14 kg/m²       Impression/Plan:  1. Acute right ankle pain      Requested Prescriptions      No prescriptions requested or ordered in this encounter     Orders Placed This Encounter   Procedures    External Referral To Podiatry     Referral Priority:   Routine     Referral Type:   Eval and Treat     Referral Reason:   Specialty Services Required     Referred to Provider:   Gonzalo Hernandes DPM     Requested Specialty:   Podiatry     Number of Visits Requested:   1       Patient giveneducational materials - see patient instructions. Discussed use, benefit, and side effects of prescribed medications. All patient questions answered. Pt voiced understanding. Reviewed health maintenance. Patient agreedwith treatment plan. Follow up as directed. **This report has been created using voice recognition software. It may contain minor errorswhich are inherent in voice recognition technology. **       Electronically signed by Mynor Hall MD on 12/11/2022 at 9:14 AM

## 2022-12-14 ENCOUNTER — TRANSCRIBE ORDERS (OUTPATIENT)
Dept: ADMINISTRATIVE | Age: 47
End: 2022-12-14

## 2022-12-14 DIAGNOSIS — S93.491D SPRAIN OF OTHER LIGAMENT OF RIGHT ANKLE, SUBSEQUENT ENCOUNTER: Primary | ICD-10-CM

## 2022-12-14 RX ORDER — ATORVASTATIN CALCIUM 20 MG/1
20 TABLET, FILM COATED ORAL DAILY
Qty: 90 TABLET | Refills: 3 | Status: SHIPPED | OUTPATIENT
Start: 2022-12-14

## 2022-12-21 ENCOUNTER — HOSPITAL ENCOUNTER (OUTPATIENT)
Dept: MRI IMAGING | Age: 47
Discharge: HOME | End: 2022-12-21
Payer: COMMERCIAL

## 2022-12-21 DIAGNOSIS — S93.491D SPRAIN OF OTHER LIGAMENT OF RIGHT ANKLE, SUBSEQUENT ENCOUNTER: ICD-10-CM

## 2022-12-21 PROCEDURE — 73721 MRI JNT OF LWR EXTRE W/O DYE: CPT

## 2023-01-05 RX ORDER — BUPROPION HYDROCHLORIDE 150 MG/1
150 TABLET ORAL EVERY MORNING
Qty: 90 TABLET | Refills: 2 | Status: SHIPPED | OUTPATIENT
Start: 2023-01-05

## 2023-02-20 RX ORDER — CYCLOBENZAPRINE HCL 10 MG
10 TABLET ORAL EVERY 8 HOURS PRN
Qty: 30 TABLET | Refills: 1 | Status: SHIPPED | OUTPATIENT
Start: 2023-02-20

## 2023-03-20 RX ORDER — MONTELUKAST SODIUM 10 MG/1
10 TABLET ORAL NIGHTLY
Qty: 90 TABLET | Refills: 0 | Status: SHIPPED | OUTPATIENT
Start: 2023-03-20

## 2023-03-21 ENCOUNTER — HOSPITAL ENCOUNTER (OUTPATIENT)
Dept: GENERAL RADIOLOGY | Age: 48
Discharge: HOME | End: 2023-03-21
Payer: COMMERCIAL

## 2023-03-21 ENCOUNTER — HOSPITAL ENCOUNTER (OUTPATIENT)
Age: 48
Discharge: HOME | End: 2023-03-21
Payer: COMMERCIAL

## 2023-03-21 DIAGNOSIS — R10.30 ABDOMINAL PAIN, LOWER: ICD-10-CM

## 2023-03-21 PROCEDURE — 74018 RADEX ABDOMEN 1 VIEW: CPT

## 2023-03-23 ENCOUNTER — NURSE ONLY (OUTPATIENT)
Dept: LAB | Age: 48
End: 2023-03-23

## 2023-03-23 LAB
C CAYETANENSIS DNA SPEC QL NAA+PROBE: NOT DETECTED
C DIFF TOX GENS STL QL NAA+PROBE: NOT DETECTED
CAMPY SP DNA.DIARRHEA STL QL NAA+PROBE: DETECTED
CRYPTOSP DNA SPEC QL NAA+PROBE: NOT DETECTED
E COLI O157H7 DNA SPEC QL NAA+PROBE: ABNORMAL
E HISTOLYT DNA SPEC QL NAA+PROBE: NOT DETECTED
EAEC PAA PLAS AGGR+AATA ST NAA+NON-PRB: NOT DETECTED
EC STX1+STX2 + H7 FLIC SPEC NAA+PROBE: NOT DETECTED
EPEC EAE GENE STL QL NAA+NON-PROBE: NOT DETECTED
ETEC LTA+ST1A+ST1B TOX ST NAA+NON-PROBE: NOT DETECTED
G LAMBLIA DNA SPEC QL NAA+PROBE: NOT DETECTED
HADV DNA SPEC QL NAA+PROBE: NOT DETECTED
HASTV RNA SPEC QL NAA+PROBE: NOT DETECTED
NOROVIRUS GI + GII RNA STL NAA+PROBE: NOT DETECTED
P SHIGELLOIDES DNA STL QL NAA+PROBE: NOT DETECTED
RV RNA SPEC QL NAA+PROBE: NOT DETECTED
SALMONELLA DNA SPEC QL NAA+PROBE: NOT DETECTED
SAPOVIRUS RNA SPEC QL NAA+PROBE: NOT DETECTED
SHIGELLA SP+EIEC IPAH ST NAA+NON-PROBE: NOT DETECTED
V CHOLERAE DNA SPEC QL NAA+PROBE: NOT DETECTED
VIBRIO DNA SPEC NAA+PROBE: NOT DETECTED
Y ENTERO RECN STL QL NAA+PROBE: NOT DETECTED

## 2023-04-02 DIAGNOSIS — Z86.69 HISTORY OF TENSION HEADACHE: Primary | ICD-10-CM

## 2023-04-02 DIAGNOSIS — F32.A DEPRESSION, UNSPECIFIED DEPRESSION TYPE: ICD-10-CM

## 2023-04-03 RX ORDER — HYDROXYZINE HYDROCHLORIDE 25 MG/1
25 TABLET, FILM COATED ORAL NIGHTLY PRN
Qty: 30 TABLET | Refills: 2 | Status: SHIPPED | OUTPATIENT
Start: 2023-04-03

## 2023-04-03 RX ORDER — ESCITALOPRAM OXALATE 20 MG/1
20 TABLET ORAL DAILY
Qty: 90 TABLET | Refills: 2 | Status: SHIPPED | OUTPATIENT
Start: 2023-04-03

## 2023-04-03 RX ORDER — BUTALBITAL, ACETAMINOPHEN AND CAFFEINE 50; 325; 40 MG/1; MG/1; MG/1
TABLET ORAL
Qty: 90 TABLET | Refills: 1 | Status: SHIPPED | OUTPATIENT
Start: 2023-04-03

## 2023-05-29 DIAGNOSIS — Z86.69 HISTORY OF TENSION HEADACHE: ICD-10-CM

## 2023-05-30 RX ORDER — BUTALBITAL, ACETAMINOPHEN AND CAFFEINE 50; 325; 40 MG/1; MG/1; MG/1
TABLET ORAL
Qty: 90 TABLET | Refills: 1 | Status: SHIPPED | OUTPATIENT
Start: 2023-05-30

## 2023-05-30 RX ORDER — CYCLOBENZAPRINE HCL 10 MG
10 TABLET ORAL EVERY 8 HOURS PRN
Qty: 30 TABLET | Refills: 1 | Status: SHIPPED | OUTPATIENT
Start: 2023-05-30

## 2023-06-08 RX ORDER — MONTELUKAST SODIUM 10 MG/1
10 TABLET ORAL NIGHTLY
Qty: 90 TABLET | Refills: 0 | Status: SHIPPED | OUTPATIENT
Start: 2023-06-08

## 2023-06-09 RX ORDER — MONTELUKAST SODIUM 10 MG/1
10 TABLET ORAL NIGHTLY
Qty: 90 TABLET | Refills: 0 | OUTPATIENT
Start: 2023-06-09

## 2023-06-23 RX ORDER — HYDROXYZINE HYDROCHLORIDE 25 MG/1
25 TABLET, FILM COATED ORAL NIGHTLY PRN
Qty: 30 TABLET | Refills: 0 | Status: SHIPPED | OUTPATIENT
Start: 2023-06-23

## 2023-07-20 RX ORDER — HYDROXYZINE HYDROCHLORIDE 25 MG/1
25 TABLET, FILM COATED ORAL NIGHTLY PRN
Qty: 30 TABLET | Refills: 2 | Status: SHIPPED | OUTPATIENT
Start: 2023-07-20

## 2023-08-06 DIAGNOSIS — Z86.69 HISTORY OF TENSION HEADACHE: ICD-10-CM

## 2023-08-07 RX ORDER — BUTALBITAL, ACETAMINOPHEN AND CAFFEINE 50; 325; 40 MG/1; MG/1; MG/1
TABLET ORAL
Qty: 90 TABLET | Refills: 1 | Status: SHIPPED | OUTPATIENT
Start: 2023-08-07

## 2023-09-05 RX ORDER — MONTELUKAST SODIUM 10 MG/1
10 TABLET ORAL NIGHTLY
Qty: 90 TABLET | Refills: 0 | OUTPATIENT
Start: 2023-09-05

## 2023-09-05 NOTE — TELEPHONE ENCOUNTER
Seen 11/22 and 12/22 for ankle sprains. Prior to that was 5/22. Pt informed she is due for an appt. Appointment scheduled for 9/12/23 at 9:30 with .

## 2023-09-12 ENCOUNTER — OFFICE VISIT (OUTPATIENT)
Dept: FAMILY MEDICINE CLINIC | Age: 48
End: 2023-09-12
Payer: COMMERCIAL

## 2023-09-12 VITALS
HEART RATE: 68 BPM | SYSTOLIC BLOOD PRESSURE: 128 MMHG | TEMPERATURE: 98.1 F | WEIGHT: 223.7 LBS | BODY MASS INDEX: 35.11 KG/M2 | RESPIRATION RATE: 16 BRPM | DIASTOLIC BLOOD PRESSURE: 78 MMHG | HEIGHT: 67 IN | OXYGEN SATURATION: 98 %

## 2023-09-12 DIAGNOSIS — F33.1 MODERATE EPISODE OF RECURRENT MAJOR DEPRESSIVE DISORDER (HCC): ICD-10-CM

## 2023-09-12 DIAGNOSIS — R07.89 ATYPICAL CHEST PAIN: Primary | ICD-10-CM

## 2023-09-12 PROBLEM — F33.0 MAJOR DEPRESSIVE DISORDER, RECURRENT, MILD (HCC): Status: ACTIVE | Noted: 2023-09-12

## 2023-09-12 PROBLEM — F33.9 MAJOR DEPRESSIVE DISORDER, RECURRENT, UNSPECIFIED (HCC): Status: ACTIVE | Noted: 2023-09-12

## 2023-09-12 PROCEDURE — 93000 ELECTROCARDIOGRAM COMPLETE: CPT | Performed by: FAMILY MEDICINE

## 2023-09-12 PROCEDURE — 99214 OFFICE O/P EST MOD 30 MIN: CPT | Performed by: FAMILY MEDICINE

## 2023-09-12 SDOH — ECONOMIC STABILITY: FOOD INSECURITY: WITHIN THE PAST 12 MONTHS, YOU WORRIED THAT YOUR FOOD WOULD RUN OUT BEFORE YOU GOT MONEY TO BUY MORE.: SOMETIMES TRUE

## 2023-09-12 SDOH — ECONOMIC STABILITY: HOUSING INSECURITY
IN THE LAST 12 MONTHS, WAS THERE A TIME WHEN YOU DID NOT HAVE A STEADY PLACE TO SLEEP OR SLEPT IN A SHELTER (INCLUDING NOW)?: NO

## 2023-09-12 SDOH — ECONOMIC STABILITY: INCOME INSECURITY: HOW HARD IS IT FOR YOU TO PAY FOR THE VERY BASICS LIKE FOOD, HOUSING, MEDICAL CARE, AND HEATING?: HARD

## 2023-09-12 SDOH — ECONOMIC STABILITY: FOOD INSECURITY: WITHIN THE PAST 12 MONTHS, THE FOOD YOU BOUGHT JUST DIDN'T LAST AND YOU DIDN'T HAVE MONEY TO GET MORE.: SOMETIMES TRUE

## 2023-09-12 SDOH — ECONOMIC STABILITY: INCOME INSECURITY: HOW HARD IS IT FOR YOU TO PAY FOR THE VERY BASICS LIKE FOOD, HOUSING, MEDICAL CARE, AND HEATING?: SOMEWHAT HARD

## 2023-09-12 ASSESSMENT — PATIENT HEALTH QUESTIONNAIRE - PHQ9
SUM OF ALL RESPONSES TO PHQ QUESTIONS 1-9: 14
9. THOUGHTS THAT YOU WOULD BE BETTER OFF DEAD, OR OF HURTING YOURSELF: 0
SUM OF ALL RESPONSES TO PHQ QUESTIONS 1-9: 14
5. POOR APPETITE OR OVEREATING: 2
6. FEELING BAD ABOUT YOURSELF - OR THAT YOU ARE A FAILURE OR HAVE LET YOURSELF OR YOUR FAMILY DOWN: 2
2. FEELING DOWN, DEPRESSED OR HOPELESS: 2
SUM OF ALL RESPONSES TO PHQ QUESTIONS 1-9: 14
3. TROUBLE FALLING OR STAYING ASLEEP: 3
SUM OF ALL RESPONSES TO PHQ9 QUESTIONS 1 & 2: 2
4. FEELING TIRED OR HAVING LITTLE ENERGY: 3
SUM OF ALL RESPONSES TO PHQ QUESTIONS 1-9: 14
1. LITTLE INTEREST OR PLEASURE IN DOING THINGS: 0
8. MOVING OR SPEAKING SO SLOWLY THAT OTHER PEOPLE COULD HAVE NOTICED. OR THE OPPOSITE, BEING SO FIGETY OR RESTLESS THAT YOU HAVE BEEN MOVING AROUND A LOT MORE THAN USUAL: 0
10. IF YOU CHECKED OFF ANY PROBLEMS, HOW DIFFICULT HAVE THESE PROBLEMS MADE IT FOR YOU TO DO YOUR WORK, TAKE CARE OF THINGS AT HOME, OR GET ALONG WITH OTHER PEOPLE: 2
7. TROUBLE CONCENTRATING ON THINGS, SUCH AS READING THE NEWSPAPER OR WATCHING TELEVISION: 2

## 2023-09-14 RX ORDER — MONTELUKAST SODIUM 10 MG/1
10 TABLET ORAL NIGHTLY
Qty: 90 TABLET | Refills: 0 | Status: SHIPPED | OUTPATIENT
Start: 2023-09-14

## 2023-09-15 ENCOUNTER — TELEPHONE (OUTPATIENT)
Dept: FAMILY MEDICINE CLINIC | Age: 48
End: 2023-09-15

## 2023-09-15 RX ORDER — ATORVASTATIN CALCIUM 20 MG/1
20 TABLET, FILM COATED ORAL DAILY
Qty: 90 TABLET | Refills: 3 | Status: SHIPPED | OUTPATIENT
Start: 2023-09-15

## 2023-09-15 NOTE — TELEPHONE ENCOUNTER
[facility] has denied Vraylar. Must try and fail ONE of the following:  Aripiprazole  Asenapine  Olanzapine  Paliperidone ER  Quetiapine Immediate Release  Quetiapine ER  Risperidone  Ziprasidone    Please advise.

## 2023-09-17 ASSESSMENT — ENCOUNTER SYMPTOMS
EYES NEGATIVE: 1
CHEST TIGHTNESS: 0
VOMITING: 0
SHORTNESS OF BREATH: 0
RHINORRHEA: 0
COUGH: 0
NAUSEA: 0
BACK PAIN: 0
ABDOMINAL PAIN: 0
DIARRHEA: 0
SORE THROAT: 0

## 2023-09-18 ENCOUNTER — CARE COORDINATION (OUTPATIENT)
Dept: CARE COORDINATION | Age: 48
End: 2023-09-18

## 2023-09-18 RX ORDER — ARIPIPRAZOLE 2 MG/1
2 TABLET ORAL DAILY
Qty: 30 TABLET | Refills: 3 | Status: SHIPPED | OUTPATIENT
Start: 2023-09-18 | End: 2023-09-18 | Stop reason: ALTCHOICE

## 2023-09-18 NOTE — CARE COORDINATION
Will mail out local food pantry list and [address] to pt. Included my contact information and encouraged a call back.

## 2023-09-28 LAB
ABSOLUTE BASO #: 0.05 K/UL (ref 0–0.2)
ABSOLUTE EOS #: 0.17 K/UL (ref 0–0.5)
ABSOLUTE LYMPH #: 2.07 K/UL (ref 1–4)
ABSOLUTE MONO #: 0.58 K/UL (ref 0.2–1)
ABSOLUTE NEUT #: 4.03 K/UL (ref 1.5–7.5)
ALBUMIN SERPL-MCNC: 4.6 G/DL (ref 3.5–5.2)
ALK PHOSPHATASE: 162 U/L (ref 40–118)
ALT SERPL-CCNC: 14 U/L (ref 5–40)
ANION GAP SERPL CALCULATED.3IONS-SCNC: 6 MEQ/L (ref 7–16)
AST SERPL-CCNC: 16 U/L (ref 9–40)
BASOPHILS RELATIVE PERCENT: 0.7 %
BILIRUB SERPL-MCNC: 0.2 MG/DL
BUN BLDV-MCNC: 11 MG/DL (ref 6–20)
CALCIUM SERPL-MCNC: 9.7 MG/DL (ref 8.5–10.5)
CHLORIDE BLD-SCNC: 102 MEQ/L (ref 95–107)
CO2: 27 MEQ/L (ref 19–31)
CREAT SERPL-MCNC: 0.83 MG/DL (ref 0.6–1.3)
EGFR IF NONAFRICAN AMERICAN: 88 ML/MIN/1.73
EOSINOPHILS RELATIVE PERCENT: 2.5 %
GLUCOSE: 82 MG/DL (ref 70–99)
HCT VFR BLD CALC: 40.9 % (ref 34–45)
HEMOGLOBIN: 13.6 G/DL (ref 11.5–15.5)
LYMPHOCYTE %: 29.9 %
MCH RBC QN AUTO: 28.6 PG (ref 25–33)
MCHC RBC AUTO-ENTMCNC: 33.3 G/DL (ref 31–36)
MCV RBC AUTO: 86.1 FL (ref 80–99)
MONOCYTES # BLD: 8.4 %
NEUTROPHILS RELATIVE PERCENT: 58.2 %
PDW BLD-RTO: 12.6 % (ref 11.5–15)
PLATELETS: 459 K/UL (ref 130–400)
PMV BLD AUTO: 9.9 FL (ref 9.3–13)
POTASSIUM SERPL-SCNC: 4.6 MEQ/L (ref 3.5–5.4)
RBC: 4.75 M/UL (ref 3.8–5.4)
SODIUM BLD-SCNC: 135 MEQ/L (ref 133–146)
T4 FREE: 1.12 NG/DL (ref 0.8–1.9)
TOTAL PROTEIN: 6.9 G/DL (ref 6.1–8.3)
TSH SERPL DL<=0.05 MIU/L-ACNC: 1.65 UIU/ML (ref 0.4–4.1)
WBC: 6.9 K/UL (ref 3.5–11)

## 2023-10-06 DIAGNOSIS — Z86.69 HISTORY OF TENSION HEADACHE: ICD-10-CM

## 2023-10-06 RX ORDER — BUTALBITAL, ACETAMINOPHEN AND CAFFEINE 50; 325; 40 MG/1; MG/1; MG/1
TABLET ORAL
Qty: 90 TABLET | Refills: 1 | Status: SHIPPED | OUTPATIENT
Start: 2023-10-06

## 2023-10-10 ENCOUNTER — OFFICE VISIT (OUTPATIENT)
Dept: FAMILY MEDICINE CLINIC | Age: 48
End: 2023-10-10
Payer: COMMERCIAL

## 2023-10-10 VITALS
SYSTOLIC BLOOD PRESSURE: 116 MMHG | HEART RATE: 74 BPM | DIASTOLIC BLOOD PRESSURE: 74 MMHG | OXYGEN SATURATION: 96 % | WEIGHT: 220 LBS | TEMPERATURE: 98.9 F | BODY MASS INDEX: 34.2 KG/M2

## 2023-10-10 DIAGNOSIS — F33.1 MODERATE EPISODE OF RECURRENT MAJOR DEPRESSIVE DISORDER (HCC): Primary | ICD-10-CM

## 2023-10-10 PROCEDURE — 99213 OFFICE O/P EST LOW 20 MIN: CPT | Performed by: FAMILY MEDICINE

## 2023-10-10 RX ORDER — QUETIAPINE FUMARATE 50 MG/1
50 TABLET, EXTENDED RELEASE ORAL NIGHTLY
Qty: 30 TABLET | Refills: 2 | Status: SHIPPED | OUTPATIENT
Start: 2023-10-10

## 2023-10-10 RX ORDER — ONDANSETRON 4 MG/1
TABLET, FILM COATED ORAL
COMMUNITY
Start: 2023-09-18

## 2023-10-15 ASSESSMENT — ENCOUNTER SYMPTOMS
CHEST TIGHTNESS: 0
SHORTNESS OF BREATH: 0
RHINORRHEA: 0
SORE THROAT: 0
EYES NEGATIVE: 1
BACK PAIN: 0
VOMITING: 0
COUGH: 0
DIARRHEA: 0
ABDOMINAL PAIN: 0
NAUSEA: 0

## 2023-11-10 RX ORDER — BUPROPION HYDROCHLORIDE 150 MG/1
150 TABLET ORAL EVERY MORNING
Qty: 90 TABLET | Refills: 2 | Status: SHIPPED | OUTPATIENT
Start: 2023-11-10

## 2023-11-28 DIAGNOSIS — Z86.69 HISTORY OF TENSION HEADACHE: ICD-10-CM

## 2023-11-28 RX ORDER — BUTALBITAL, ACETAMINOPHEN AND CAFFEINE 50; 325; 40 MG/1; MG/1; MG/1
TABLET ORAL
Qty: 90 TABLET | Refills: 1 | Status: SHIPPED | OUTPATIENT
Start: 2023-11-28

## 2023-12-04 RX ORDER — MONTELUKAST SODIUM 10 MG/1
10 TABLET ORAL NIGHTLY
Qty: 90 TABLET | Refills: 2 | Status: SHIPPED | OUTPATIENT
Start: 2023-12-04

## 2023-12-29 DIAGNOSIS — F32.A DEPRESSION, UNSPECIFIED DEPRESSION TYPE: ICD-10-CM

## 2023-12-29 RX ORDER — QUETIAPINE FUMARATE 50 MG/1
50 TABLET, EXTENDED RELEASE ORAL NIGHTLY
Qty: 30 TABLET | Refills: 3 | Status: SHIPPED | OUTPATIENT
Start: 2023-12-29

## 2024-01-02 RX ORDER — ESCITALOPRAM OXALATE 20 MG/1
20 TABLET ORAL DAILY
Qty: 90 TABLET | Refills: 2 | Status: SHIPPED | OUTPATIENT
Start: 2024-01-02

## 2024-01-20 DIAGNOSIS — Z86.69 HISTORY OF TENSION HEADACHE: ICD-10-CM

## 2024-01-22 RX ORDER — BUTALBITAL, ACETAMINOPHEN AND CAFFEINE 50; 325; 40 MG/1; MG/1; MG/1
TABLET ORAL
Qty: 90 TABLET | Refills: 1 | Status: SHIPPED | OUTPATIENT
Start: 2024-01-22

## 2024-03-11 ENCOUNTER — HOSPITAL ENCOUNTER (EMERGENCY)
Age: 49
Discharge: HOME | End: 2024-03-11
Payer: COMMERCIAL

## 2024-03-11 VITALS
SYSTOLIC BLOOD PRESSURE: 141 MMHG | BODY MASS INDEX: 34.82 KG/M2 | RESPIRATION RATE: 18 BRPM | HEART RATE: 92 BPM | OXYGEN SATURATION: 97 % | TEMPERATURE: 97.6 F | WEIGHT: 224 LBS | DIASTOLIC BLOOD PRESSURE: 88 MMHG

## 2024-03-11 DIAGNOSIS — J06.9 VIRAL URI WITH COUGH: Primary | ICD-10-CM

## 2024-03-11 LAB
FLUAV AG SPEC QL: NEGATIVE
FLUBV AG SPEC QL: NEGATIVE
S PYO AG THROAT QL: NEGATIVE

## 2024-03-11 PROCEDURE — 99213 OFFICE O/P EST LOW 20 MIN: CPT

## 2024-03-11 PROCEDURE — 87651 STREP A DNA AMP PROBE: CPT

## 2024-03-11 PROCEDURE — 99213 OFFICE O/P EST LOW 20 MIN: CPT | Performed by: EMERGENCY MEDICINE

## 2024-03-11 PROCEDURE — 87804 INFLUENZA ASSAY W/OPTIC: CPT

## 2024-03-11 RX ORDER — ESOMEPRAZOLE MAGNESIUM 40 MG/1
CAPSULE, DELAYED RELEASE ORAL
COMMUNITY
Start: 2024-03-10

## 2024-03-11 ASSESSMENT — ENCOUNTER SYMPTOMS
COUGH: 1
SORE THROAT: 1
WHEEZING: 0
SHORTNESS OF BREATH: 0
RHINORRHEA: 1

## 2024-03-11 NOTE — ED PROVIDER NOTES
tablet by mouth once daily, Disp-90 tablet, R-2Normal      QUEtiapine (SEROQUEL XR) 50 MG extended release tablet Take 1 tablet by mouth nightly, Disp-30 tablet, R-3Normal      hydrOXYzine HCl (ATARAX) 25 MG tablet TAKE 1 TABLET BY MOUTH NIGHTLY AS NEEDED FOR ITCHING, Disp-30 tablet, R-0Normal      montelukast (SINGULAIR) 10 MG tablet Take 1 tablet by mouth nightly, Disp-90 tablet, R-2Normal      buPROPion (WELLBUTRIN XL) 150 MG extended release tablet Take 1 tablet by mouth every morning, Disp-90 tablet, R-2Normal      ondansetron (ZOFRAN) 4 MG tablet Historical Med      atorvastatin (LIPITOR) 20 MG tablet Take 1 tablet by mouth daily, Disp-90 tablet, R-3Normal      cyclobenzaprine (FLEXERIL) 10 MG tablet Take 1 tablet by mouth every 8 hours as needed for Muscle spasms, Disp-30 tablet, R-1Normal      famotidine (PEPCID) 40 MG tablet Take 1 tablet by mouth dailyHistorical Med      diclofenac sodium (VOLTAREN) 1 % GEL Apply 4 g topically 4 times daily as needed for Pain, Topical, 4 TIMES DAILY PRN Starting Tue 11/8/2022, Disp-100 g, R-1, Normal      lidocaine (LMX) 4 % cream To left knee 3 times daily as needed for pain.  Apply topically as needed., Disp-120 g, R-1, Normal      acetaminophen (AMINOFEN) 325 MG tablet Take 2 tablets by mouth every 6 hours as needed for Pain, Disp-120 tablet, R-3OTC      albuterol sulfate HFA (PROAIR HFA) 108 (90 Base) MCG/ACT inhaler Inhale 2 puffs into the lungs every 6 hours as needed for Wheezing, Disp-1 Inhaler, R-5Normal      albuterol (PROVENTIL) (2.5 MG/3ML) 0.083% nebulizer solution Take 3 mLs by nebulization every 6 hours as needed for Wheezing, Disp-25 vial, R-1Normal      dicyclomine (BENTYL) 20 MG tablet TAKE 1 TABLET BY MOUTH THREE TIMES DAILY AS NEEDED FOR ABDOMINAL CRAMPS, Disp-60 tablet, R-0      sucralfate (CARAFATE) 1 GM tablet Take 1 tablet by mouth 2 times daily, R-11Historical Med             ALLERGIES     Patient is has No Known Allergies.    Patients  heard.  Pulmonary:      Effort: Pulmonary effort is normal. No respiratory distress.      Breath sounds: Normal breath sounds. No wheezing or rhonchi.   Musculoskeletal:      Cervical back: Normal range of motion.   Lymphadenopathy:      Cervical: No cervical adenopathy.   Skin:     General: Skin is warm and dry.   Neurological:      Mental Status: She is alert.         DIAGNOSTIC RESULTS     Labs:  Results for orders placed or performed during the hospital encounter of 03/11/24   Rapid influenza A/B antigens    Specimen: Nasopharyngeal   Result Value Ref Range    Flu A Antigen Negative NEGATIVE    Flu B Antigen Negative NEGATIVE   Strep Screen Group A Throat   Result Value Ref Range    Rapid Strep A Screen NEGATIVE        IMAGING:    No orders to display         EKG:      URGENT CARE COURSE:     Vitals:    03/11/24 0859   BP: (!) 141/88   Pulse: 92   Resp: 18   Temp: 97.6 °F (36.4 °C)   TempSrc: Temporal   SpO2: 97%   Weight: 101.6 kg (224 lb)       Medications - No data to display         PROCEDURES:  None    FINAL IMPRESSION      1. Viral URI with cough          DISPOSITION/ PLAN     Patient presents for viral URI with cough.  Rapid influenza and strep testing are negative.  She will be discharged and advised to continue over-the-counter medications as needed for treatment symptoms.  Drink plenty of fluids.  Follow-up [facility] physician if no significant improvement in 5 to 7 days.  Return sooner for new or worsening symptoms      PATIENT REFERRED TO:  Terrance Bueno MD  [address] / [facility] [address]      DISCHARGE MEDICATIONS:  Discharge Medication List as of 3/11/2024  9:28 AM          Discharge Medication List as of 3/11/2024  9:28 AM        STOP taking these medications       dexlansoprazole (DEXILANT) 60 MG CPDR delayed release capsule Comments:   Reason for Stopping:               Discharge Medication List as of 3/11/2024  9:28 AM          BERE Reno - CNP    (Please note that portions of this

## 2024-03-11 NOTE — DISCHARGE INSTRUCTIONS
Continue DayQuil/NyQuil for treatment of symptoms    Drink plenty of fluids    Tylenol/ibuprofen as needed    Return for new or worsening symptoms

## 2024-03-13 ENCOUNTER — PATIENT MESSAGE (OUTPATIENT)
Dept: FAMILY MEDICINE CLINIC | Age: 49
End: 2024-03-13

## 2024-03-14 ENCOUNTER — HOSPITAL ENCOUNTER (EMERGENCY)
Age: 49
Discharge: HOME | End: 2024-03-14
Payer: COMMERCIAL

## 2024-03-14 ENCOUNTER — TELEPHONE (OUTPATIENT)
Dept: FAMILY MEDICINE CLINIC | Age: 49
End: 2024-03-14

## 2024-03-14 VITALS
DIASTOLIC BLOOD PRESSURE: 76 MMHG | OXYGEN SATURATION: 98 % | WEIGHT: 224 LBS | TEMPERATURE: 99.4 F | SYSTOLIC BLOOD PRESSURE: 122 MMHG | HEART RATE: 103 BPM | RESPIRATION RATE: 16 BRPM | HEIGHT: 67 IN | BODY MASS INDEX: 35.16 KG/M2

## 2024-03-14 DIAGNOSIS — J40 BRONCHITIS: ICD-10-CM

## 2024-03-14 DIAGNOSIS — H66.93 BILATERAL OTITIS MEDIA, UNSPECIFIED OTITIS MEDIA TYPE: ICD-10-CM

## 2024-03-14 DIAGNOSIS — J32.9 RHINOSINUSITIS: Primary | ICD-10-CM

## 2024-03-14 PROCEDURE — 99213 OFFICE O/P EST LOW 20 MIN: CPT

## 2024-03-14 PROCEDURE — 99213 OFFICE O/P EST LOW 20 MIN: CPT | Performed by: EMERGENCY MEDICINE

## 2024-03-14 RX ORDER — PREDNISONE 20 MG/1
20 TABLET ORAL 2 TIMES DAILY
Qty: 10 TABLET | Refills: 0 | Status: SHIPPED | OUTPATIENT
Start: 2024-03-14 | End: 2024-03-19

## 2024-03-14 RX ORDER — DEXTROMETHORPHAN HYDROBROMIDE AND PROMETHAZINE HYDROCHLORIDE 15; 6.25 MG/5ML; MG/5ML
5 SYRUP ORAL 4 TIMES DAILY PRN
Qty: 100 ML | Refills: 0 | Status: SHIPPED | OUTPATIENT
Start: 2024-03-14 | End: 2024-03-21

## 2024-03-14 RX ORDER — AMOXICILLIN AND CLAVULANATE POTASSIUM 875; 125 MG/1; MG/1
1 TABLET, FILM COATED ORAL 2 TIMES DAILY
Qty: 14 TABLET | Refills: 0 | Status: SHIPPED | OUTPATIENT
Start: 2024-03-14 | End: 2024-03-21

## 2024-03-14 ASSESSMENT — ENCOUNTER SYMPTOMS
RHINORRHEA: 1
COUGH: 1
WHEEZING: 1
SHORTNESS OF BREATH: 0
SINUS PRESSURE: 1
CHEST TIGHTNESS: 1
SINUS PAIN: 1
ABDOMINAL PAIN: 0

## 2024-03-14 ASSESSMENT — PAIN SCALES - GENERAL: PAINLEVEL_OUTOF10: 8

## 2024-03-14 ASSESSMENT — PAIN DESCRIPTION - PAIN TYPE: TYPE: ACUTE PAIN

## 2024-03-14 ASSESSMENT — PAIN - FUNCTIONAL ASSESSMENT: PAIN_FUNCTIONAL_ASSESSMENT: 0-10

## 2024-03-14 ASSESSMENT — PAIN DESCRIPTION - LOCATION: LOCATION: EAR

## 2024-03-14 ASSESSMENT — PAIN DESCRIPTION - ONSET: ONSET: ON-GOING

## 2024-03-14 ASSESSMENT — PAIN DESCRIPTION - FREQUENCY: FREQUENCY: INTERMITTENT

## 2024-03-14 ASSESSMENT — PAIN DESCRIPTION - ORIENTATION: ORIENTATION: RIGHT;LEFT

## 2024-03-14 NOTE — DISCHARGE INSTRUCTIONS
Augmentin as prescribed until gone    Prednisone as directed    Promethazine DM as directed as needed for cough    Use over-the-counter decongestant such as Sudafed    Drink plenty water    Tylenol/ibuprofen as needed for fever or pain    Return for new or worsening symptoms

## 2024-03-14 NOTE — TELEPHONE ENCOUNTER
Received the following message today thru My Chart, we have no appointments available.      I have been sick since Saturday.  I went to urgent care on Monday.  I tested negative for strept, and influenza. I still have a sore throat, double ear aches, congestion and a cough.  I am coughing up yellow gunk.  Is there a way to get an antibiotic or come in to be seen Thursday? I haven't been to work all week.  It hurts too much.     WM-A  CPB

## 2024-03-14 NOTE — ED PROVIDER NOTES
[facility] URGENT CARE  Urgent Care Encounter       CHIEF COMPLAINT       Chief Complaint   Patient presents with    Cough    Otalgia    Nasal Congestion       Nurses Notes reviewed and I agree except as noted in the HPI.  HISTORY OF PRESENT ILLNESS   Cayla Dave is a 48 y.o. female who presents for complaints of sinus pressure, congestion, worsening cough, states this is aggravating her asthma.  She has bilateral ear pain.  Fever and chills.  Patient was seen at the urgent care on 3/11/2024 and diagnosed with viral URI.  She has been taken over-the-counter medications with no improvement in symptoms.  She states symptoms have significantly worsened    HPI    REVIEW OF SYSTEMS     Review of Systems   Constitutional:  Positive for chills, fatigue and fever. Negative for activity change.   HENT:  Positive for congestion, ear pain, rhinorrhea, sinus pressure and sinus pain. Negative for ear discharge.    Respiratory:  Positive for cough, chest tightness and wheezing. Negative for shortness of breath.    Cardiovascular:  Negative for chest pain.   Gastrointestinal:  Negative for abdominal pain.   Neurological:  Positive for headaches. Negative for dizziness.       PAST MEDICAL HISTORY         Diagnosis Date    Anxiety     Arthritis     Asthma     Depression     Diverticulitis     GERD (gastroesophageal reflux disease)     Hiatal hernia     Hyperlipidemia     Migraines     Obesity        SURGICALHISTORY     Patient  has a past surgical history that includes knee surgery (1990); laparoscopy (1997); Ureter stent placement (2004); Cholecystectomy, laparoscopic (3/4/13); and Dilation and curettage of uterus (N/A, 3/15/2021).    CURRENT MEDICATIONS       Discharge Medication List as of 3/14/2024 11:57 AM        CONTINUE these medications which have NOT CHANGED    Details   esomeprazole (NEXIUM) 40 MG delayed release capsule Historical Med      butalbital-acetaminophen-caffeine (FIORICET, ESGIC) -40 MG  by mouth 2 times daily for 7 days, Disp-14 tablet, R-0Normal      predniSONE (DELTASONE) 20 MG tablet Take 1 tablet by mouth 2 times daily for 5 days, Disp-10 tablet, R-0Normal      promethazine-dextromethorphan (PROMETHAZINE-DM) 6.25-15 MG/5ML syrup Take 5 mLs by mouth 4 times daily as needed for Cough, Disp-100 mL, R-0Normal             Discharge Medication List as of 3/14/2024 11:57 AM          Discharge Medication List as of 3/14/2024 11:57 AM          BERE Reno - CNP    (Please note that portions of this note were completed with a voice recognition program. Efforts were made to edit the dictations but occasionally words are mis-transcribed.)           Julien Chiu, BERE - CNP  03/14/24 2605

## 2024-03-14 NOTE — TELEPHONE ENCOUNTER
From: Cayla Dave  To: Dr. Terrance Bueno  Sent: 3/13/2024 7:02 PM EDT  Subject: Sore throat, ear ache, congestion    I have been sick since Saturday. I went to urgent care on Monday. I tested negative for strept, and influenza. I still have a sore throat, double ear aches, congestion and a cough. I am coughing up yellow gunk. Is there a way to get an antibiotic or come in to be seen Thursday? I haven't been to work all week. It hurts too much.

## 2024-03-21 ENCOUNTER — OFFICE VISIT (OUTPATIENT)
Dept: FAMILY MEDICINE CLINIC | Age: 49
End: 2024-03-21
Payer: COMMERCIAL

## 2024-03-21 VITALS
DIASTOLIC BLOOD PRESSURE: 78 MMHG | WEIGHT: 216 LBS | BODY MASS INDEX: 33.83 KG/M2 | RESPIRATION RATE: 16 BRPM | SYSTOLIC BLOOD PRESSURE: 130 MMHG | TEMPERATURE: 98.9 F | HEART RATE: 88 BPM

## 2024-03-21 DIAGNOSIS — H65.02 ACUTE SEROUS OTITIS MEDIA OF LEFT EAR, RECURRENCE NOT SPECIFIED: ICD-10-CM

## 2024-03-21 DIAGNOSIS — J20.9 ACUTE BRONCHITIS, UNSPECIFIED ORGANISM: Primary | ICD-10-CM

## 2024-03-21 PROCEDURE — G8484 FLU IMMUNIZE NO ADMIN: HCPCS | Performed by: NURSE PRACTITIONER

## 2024-03-21 PROCEDURE — G8427 DOCREV CUR MEDS BY ELIG CLIN: HCPCS | Performed by: NURSE PRACTITIONER

## 2024-03-21 PROCEDURE — 99214 OFFICE O/P EST MOD 30 MIN: CPT | Performed by: NURSE PRACTITIONER

## 2024-03-21 PROCEDURE — G8417 CALC BMI ABV UP PARAM F/U: HCPCS | Performed by: NURSE PRACTITIONER

## 2024-03-21 PROCEDURE — 1036F TOBACCO NON-USER: CPT | Performed by: NURSE PRACTITIONER

## 2024-03-21 RX ORDER — DOXYCYCLINE HYCLATE 100 MG
100 TABLET ORAL 2 TIMES DAILY
Qty: 20 TABLET | Refills: 0 | Status: SHIPPED | OUTPATIENT
Start: 2024-03-21 | End: 2024-03-31

## 2024-03-21 ASSESSMENT — PATIENT HEALTH QUESTIONNAIRE - PHQ9
4. FEELING TIRED OR HAVING LITTLE ENERGY: NOT AT ALL
6. FEELING BAD ABOUT YOURSELF - OR THAT YOU ARE A FAILURE OR HAVE LET YOURSELF OR YOUR FAMILY DOWN: NOT AT ALL
2. FEELING DOWN, DEPRESSED OR HOPELESS: NOT AT ALL
5. POOR APPETITE OR OVEREATING: NOT AT ALL
SUM OF ALL RESPONSES TO PHQ9 QUESTIONS 1 & 2: 0
SUM OF ALL RESPONSES TO PHQ QUESTIONS 1-9: 0
1. LITTLE INTEREST OR PLEASURE IN DOING THINGS: NOT AT ALL
9. THOUGHTS THAT YOU WOULD BE BETTER OFF DEAD, OR OF HURTING YOURSELF: NOT AT ALL
7. TROUBLE CONCENTRATING ON THINGS, SUCH AS READING THE NEWSPAPER OR WATCHING TELEVISION: NOT AT ALL
8. MOVING OR SPEAKING SO SLOWLY THAT OTHER PEOPLE COULD HAVE NOTICED. OR THE OPPOSITE, BEING SO FIGETY OR RESTLESS THAT YOU HAVE BEEN MOVING AROUND A LOT MORE THAN USUAL: NOT AT ALL
3. TROUBLE FALLING OR STAYING ASLEEP: NOT AT ALL
SUM OF ALL RESPONSES TO PHQ QUESTIONS 1-9: 0
10. IF YOU CHECKED OFF ANY PROBLEMS, HOW DIFFICULT HAVE THESE PROBLEMS MADE IT FOR YOU TO DO YOUR WORK, TAKE CARE OF THINGS AT HOME, OR GET ALONG WITH OTHER PEOPLE: NOT DIFFICULT AT ALL
SUM OF ALL RESPONSES TO PHQ QUESTIONS 1-9: 0
SUM OF ALL RESPONSES TO PHQ QUESTIONS 1-9: 0

## 2024-03-21 ASSESSMENT — ENCOUNTER SYMPTOMS
VOMITING: 0
BACK PAIN: 0
COUGH: 1
ABDOMINAL PAIN: 0
ALLERGIC/IMMUNOLOGIC NEGATIVE: 1
EYE REDNESS: 0
EYE PAIN: 0
SORE THROAT: 1
NAUSEA: 0
CONSTIPATION: 0
RHINORRHEA: 0
WHEEZING: 0
SINUS PRESSURE: 0
DIARRHEA: 0
TROUBLE SWALLOWING: 0
EYE DISCHARGE: 0
SHORTNESS OF BREATH: 0

## 2024-03-21 NOTE — PROGRESS NOTES
[facility] PROFESSIONAL SERV[facility]  [facility]  [address] [address]  Dept: [phone]  Dept Fax: [phone]  Loc: [phone]     Visit Date:  3/21/2024      Patient:  Cayla Dave  YOB: 1975    HPI:     Chief Complaint   Patient presents with    Follow-up     UC follow up- still has a cough, right ear pain/fullness. UC said she had bronchitis and ear infection. Completed her antibiotic this morning.        Patient for lying up after being in the urgent care 1 week ago and treated for bronchial sinusitis and treated with Augmentin, prednisone and cough medicine.  Continues to cough with a dry cough, sore throat, sore from coughing, not sleeping well.  Denies fever or chills.        Medications    Current Outpatient Medications:     doxycycline hyclate (VIBRA-TABS) 100 MG tablet, Take 1 tablet by mouth 2 times daily for 10 days, Disp: 20 tablet, Rfl: 0    promethazine-dextromethorphan (PROMETHAZINE-DM) 6.25-15 MG/5ML syrup, Take 5 mLs by mouth 4 times daily as needed for Cough, Disp: 100 mL, Rfl: 0    esomeprazole (NEXIUM) 40 MG delayed release capsule, , Disp: , Rfl:     butalbital-acetaminophen-caffeine (FIORICET, ESGIC) -40 MG per tablet, Take 1 to 2 tablets by  Mouth every 6 hours as needed for headache, Disp: 90 tablet, Rfl: 1    escitalopram (LEXAPRO) 20 MG tablet, Take 1 tablet by mouth daily Take 1 tablet by mouth once daily, Disp: 90 tablet, Rfl: 2    QUEtiapine (SEROQUEL XR) 50 MG extended release tablet, Take 1 tablet by mouth nightly, Disp: 30 tablet, Rfl: 3    hydrOXYzine HCl (ATARAX) 25 MG tablet, TAKE 1 TABLET BY MOUTH NIGHTLY AS NEEDED FOR ITCHING, Disp: 30 tablet, Rfl: 0    montelukast (SINGULAIR) 10 MG tablet, Take 1 tablet by mouth nightly, Disp: 90 tablet, Rfl: 2    buPROPion (WELLBUTRIN XL) 150 MG extended release tablet, Take 1 tablet by mouth every morning, Disp: 90 tablet, Rfl: 2    ondansetron (ZOFRAN) 4 MG tablet, ,

## 2024-03-22 RX ORDER — DEXTROMETHORPHAN HYDROBROMIDE AND PROMETHAZINE HYDROCHLORIDE 15; 6.25 MG/5ML; MG/5ML
5 SYRUP ORAL 4 TIMES DAILY PRN
Qty: 118 ML | Refills: 0 | Status: SHIPPED | OUTPATIENT
Start: 2024-03-22 | End: 2024-03-29

## 2024-03-28 DIAGNOSIS — Z86.69 HISTORY OF TENSION HEADACHE: ICD-10-CM

## 2024-03-28 RX ORDER — BUTALBITAL, ACETAMINOPHEN AND CAFFEINE 50; 325; 40 MG/1; MG/1; MG/1
TABLET ORAL
Qty: 90 TABLET | Refills: 1 | Status: SHIPPED | OUTPATIENT
Start: 2024-03-28

## 2024-04-29 RX ORDER — QUETIAPINE FUMARATE 50 MG/1
50 TABLET, EXTENDED RELEASE ORAL NIGHTLY
Qty: 30 TABLET | Refills: 3 | Status: SHIPPED | OUTPATIENT
Start: 2024-04-29

## 2024-04-30 RX ORDER — QUETIAPINE FUMARATE 50 MG/1
50 TABLET, EXTENDED RELEASE ORAL NIGHTLY
Qty: 30 TABLET | Refills: 3 | Status: CANCELLED | OUTPATIENT
Start: 2024-04-30

## 2024-05-02 ENCOUNTER — PATIENT MESSAGE (OUTPATIENT)
Dept: FAMILY MEDICINE CLINIC | Age: 49
End: 2024-05-02

## 2024-05-07 ENCOUNTER — HOSPITAL ENCOUNTER (OUTPATIENT)
Dept: NUCLEAR MEDICINE | Age: 49
Discharge: HOME | End: 2024-05-07
Attending: PODIATRIST
Payer: COMMERCIAL

## 2024-05-07 DIAGNOSIS — M84.475G: ICD-10-CM

## 2024-05-07 DIAGNOSIS — S92.022D CLOSED DISPLACED FRACTURE OF ANTERIOR PROCESS OF LEFT CALCANEUS WITH ROUTINE HEALING, SUBSEQUENT ENCOUNTER: ICD-10-CM

## 2024-05-07 PROCEDURE — 78315 BONE IMAGING 3 PHASE: CPT

## 2024-05-07 PROCEDURE — 3430000000 HC RX DIAGNOSTIC RADIOPHARMACEUTICAL: Performed by: PODIATRIST

## 2024-05-07 PROCEDURE — A9503 TC99M MEDRONATE: HCPCS | Performed by: PODIATRIST

## 2024-05-07 RX ORDER — TC 99M MEDRONATE 20 MG/10ML
25 INJECTION, POWDER, LYOPHILIZED, FOR SOLUTION INTRAVENOUS
Status: COMPLETED | OUTPATIENT
Start: 2024-05-07 | End: 2024-05-07

## 2024-05-07 RX ADMIN — TC 99M MEDRONATE 25 MILLICURIE: 20 INJECTION, POWDER, LYOPHILIZED, FOR SOLUTION INTRAVENOUS at 09:02

## 2024-05-14 DIAGNOSIS — Z86.69 HISTORY OF TENSION HEADACHE: ICD-10-CM

## 2024-05-14 RX ORDER — BUTALBITAL, ACETAMINOPHEN AND CAFFEINE 50; 325; 40 MG/1; MG/1; MG/1
TABLET ORAL
Qty: 90 TABLET | Refills: 1 | Status: SHIPPED | OUTPATIENT
Start: 2024-05-14

## 2024-05-17 ENCOUNTER — TRANSCRIBE ORDERS (OUTPATIENT)
Dept: ADMINISTRATIVE | Age: 49
End: 2024-05-17

## 2024-05-17 DIAGNOSIS — M84.475G: Primary | ICD-10-CM

## 2024-05-31 ENCOUNTER — OFFICE VISIT (OUTPATIENT)
Dept: FAMILY MEDICINE CLINIC | Age: 49
End: 2024-05-31
Payer: COMMERCIAL

## 2024-05-31 VITALS
OXYGEN SATURATION: 96 % | DIASTOLIC BLOOD PRESSURE: 80 MMHG | WEIGHT: 223 LBS | RESPIRATION RATE: 16 BRPM | TEMPERATURE: 99.6 F | BODY MASS INDEX: 34.93 KG/M2 | SYSTOLIC BLOOD PRESSURE: 120 MMHG | HEART RATE: 95 BPM

## 2024-05-31 DIAGNOSIS — R33.9 INCOMPLETE EMPTYING OF BLADDER: ICD-10-CM

## 2024-05-31 DIAGNOSIS — R35.0 FREQUENT URINATION: Primary | ICD-10-CM

## 2024-05-31 DIAGNOSIS — M25.561 ACUTE PAIN OF RIGHT KNEE: ICD-10-CM

## 2024-05-31 PROCEDURE — 99214 OFFICE O/P EST MOD 30 MIN: CPT | Performed by: NURSE PRACTITIONER

## 2024-05-31 PROCEDURE — G8427 DOCREV CUR MEDS BY ELIG CLIN: HCPCS | Performed by: NURSE PRACTITIONER

## 2024-05-31 PROCEDURE — 1036F TOBACCO NON-USER: CPT | Performed by: NURSE PRACTITIONER

## 2024-05-31 PROCEDURE — 81003 URINALYSIS AUTO W/O SCOPE: CPT | Performed by: NURSE PRACTITIONER

## 2024-05-31 PROCEDURE — G8417 CALC BMI ABV UP PARAM F/U: HCPCS | Performed by: NURSE PRACTITIONER

## 2024-05-31 RX ORDER — PREDNISONE 20 MG/1
20 TABLET ORAL 2 TIMES DAILY
Qty: 10 TABLET | Refills: 0 | Status: SHIPPED | OUTPATIENT
Start: 2024-05-31 | End: 2024-06-05

## 2024-06-01 LAB
BACTERIA UR CULT: ABNORMAL
ORGANISM: ABNORMAL

## 2024-06-03 ASSESSMENT — ENCOUNTER SYMPTOMS
SHORTNESS OF BREATH: 0
NAUSEA: 0
SORE THROAT: 0
VOMITING: 0
CONSTIPATION: 0
EYE REDNESS: 0
RHINORRHEA: 0
WHEEZING: 0
ALLERGIC/IMMUNOLOGIC NEGATIVE: 1
TROUBLE SWALLOWING: 0
COUGH: 0
EYE PAIN: 0
EYE DISCHARGE: 0
BACK PAIN: 0
ABDOMINAL PAIN: 0
DIARRHEA: 0

## 2024-06-04 ENCOUNTER — HOSPITAL ENCOUNTER (OUTPATIENT)
Dept: MRI IMAGING | Age: 49
Discharge: HOME | End: 2024-06-04
Attending: PODIATRIST
Payer: COMMERCIAL

## 2024-06-04 DIAGNOSIS — M84.475G: ICD-10-CM

## 2024-06-04 DIAGNOSIS — Z86.69 HISTORY OF TENSION HEADACHE: ICD-10-CM

## 2024-06-04 PROCEDURE — A9579 GAD-BASE MR CONTRAST NOS,1ML: HCPCS | Performed by: PODIATRIST

## 2024-06-04 PROCEDURE — 6360000004 HC RX CONTRAST MEDICATION: Performed by: PODIATRIST

## 2024-06-04 PROCEDURE — 73720 MRI LWR EXTREMITY W/O&W/DYE: CPT

## 2024-06-04 RX ORDER — BUTALBITAL, ACETAMINOPHEN AND CAFFEINE 50; 325; 40 MG/1; MG/1; MG/1
TABLET ORAL
Qty: 90 TABLET | Refills: 1 | Status: CANCELLED | OUTPATIENT
Start: 2024-06-04

## 2024-06-04 RX ADMIN — GADOTERIDOL 20 ML: 279.3 INJECTION, SOLUTION INTRAVENOUS at 06:46

## 2024-07-02 DIAGNOSIS — F32.A DEPRESSION, UNSPECIFIED DEPRESSION TYPE: ICD-10-CM

## 2024-07-03 RX ORDER — ESCITALOPRAM OXALATE 20 MG/1
20 TABLET ORAL DAILY
Qty: 90 TABLET | Refills: 2 | Status: SHIPPED | OUTPATIENT
Start: 2024-07-03

## 2024-07-03 RX ORDER — QUETIAPINE FUMARATE 50 MG/1
50 TABLET, EXTENDED RELEASE ORAL NIGHTLY
Qty: 30 TABLET | Refills: 3 | Status: SHIPPED | OUTPATIENT
Start: 2024-07-03

## 2024-07-08 DIAGNOSIS — Z86.69 HISTORY OF TENSION HEADACHE: ICD-10-CM

## 2024-07-08 RX ORDER — ATORVASTATIN CALCIUM 20 MG/1
20 TABLET, FILM COATED ORAL DAILY
Qty: 90 TABLET | Refills: 2 | Status: SHIPPED | OUTPATIENT
Start: 2024-07-08

## 2024-07-08 RX ORDER — BUTALBITAL, ACETAMINOPHEN AND CAFFEINE 50; 325; 40 MG/1; MG/1; MG/1
TABLET ORAL
Qty: 90 TABLET | Refills: 1 | Status: SHIPPED | OUTPATIENT
Start: 2024-07-08

## 2024-07-29 RX ORDER — BUPROPION HYDROCHLORIDE 150 MG/1
150 TABLET ORAL EVERY MORNING
Qty: 90 TABLET | Refills: 2 | Status: SHIPPED | OUTPATIENT
Start: 2024-07-29

## 2024-08-27 RX ORDER — ALBUTEROL SULFATE 0.83 MG/ML
2.5 SOLUTION RESPIRATORY (INHALATION) EVERY 6 HOURS PRN
Qty: 25 EACH | Refills: 1 | Status: SHIPPED | OUTPATIENT
Start: 2024-08-27

## 2024-08-29 DIAGNOSIS — Z86.69 HISTORY OF TENSION HEADACHE: ICD-10-CM

## 2024-08-29 RX ORDER — BUTALBITAL, ACETAMINOPHEN AND CAFFEINE 50; 325; 40 MG/1; MG/1; MG/1
TABLET ORAL
Qty: 90 TABLET | Refills: 0 | Status: SHIPPED | OUTPATIENT
Start: 2024-08-29

## 2024-08-29 NOTE — TELEPHONE ENCOUNTER
Seen in May and March for acute visits  Last OV Oct for depression, last labs Sept. Rx pending for #90 with note to f/u in Sept for routine appt.

## 2024-09-19 RX ORDER — MONTELUKAST SODIUM 10 MG/1
10 TABLET ORAL NIGHTLY
Qty: 90 TABLET | Refills: 2 | Status: SHIPPED | OUTPATIENT
Start: 2024-09-19

## 2024-09-26 DIAGNOSIS — Z86.69 HISTORY OF TENSION HEADACHE: ICD-10-CM

## 2024-09-26 RX ORDER — BUTALBITAL, ACETAMINOPHEN AND CAFFEINE 50; 325; 40 MG/1; MG/1; MG/1
TABLET ORAL
Qty: 90 TABLET | Refills: 0 | OUTPATIENT
Start: 2024-09-26

## 2024-09-26 RX ORDER — BUTALBITAL, ACETAMINOPHEN AND CAFFEINE 50; 325; 40 MG/1; MG/1; MG/1
TABLET ORAL
Qty: 90 TABLET | Refills: 0 | Status: SHIPPED | OUTPATIENT
Start: 2024-09-26

## 2024-10-02 ENCOUNTER — OFFICE VISIT (OUTPATIENT)
Dept: FAMILY MEDICINE CLINIC | Age: 49
End: 2024-10-02
Payer: COMMERCIAL

## 2024-10-02 VITALS
SYSTOLIC BLOOD PRESSURE: 122 MMHG | WEIGHT: 224 LBS | BODY MASS INDEX: 35.08 KG/M2 | HEART RATE: 106 BPM | OXYGEN SATURATION: 96 % | DIASTOLIC BLOOD PRESSURE: 80 MMHG | RESPIRATION RATE: 16 BRPM | TEMPERATURE: 99.7 F

## 2024-10-02 DIAGNOSIS — J02.9 ACUTE PHARYNGITIS, UNSPECIFIED ETIOLOGY: ICD-10-CM

## 2024-10-02 DIAGNOSIS — J20.9 ACUTE BRONCHITIS, UNSPECIFIED ORGANISM: Primary | ICD-10-CM

## 2024-10-02 DIAGNOSIS — F33.1 MODERATE EPISODE OF RECURRENT MAJOR DEPRESSIVE DISORDER (HCC): ICD-10-CM

## 2024-10-02 DIAGNOSIS — R05.1 ACUTE COUGH: ICD-10-CM

## 2024-10-02 LAB
Lab: NORMAL
QC PASS/FAIL: NORMAL
SARS-COV-2 RDRP RESP QL NAA+PROBE: NEGATIVE

## 2024-10-02 PROCEDURE — 99213 OFFICE O/P EST LOW 20 MIN: CPT | Performed by: FAMILY MEDICINE

## 2024-10-02 PROCEDURE — 87635 SARS-COV-2 COVID-19 AMP PRB: CPT | Performed by: FAMILY MEDICINE

## 2024-10-02 PROCEDURE — G8417 CALC BMI ABV UP PARAM F/U: HCPCS | Performed by: FAMILY MEDICINE

## 2024-10-02 PROCEDURE — 1036F TOBACCO NON-USER: CPT | Performed by: FAMILY MEDICINE

## 2024-10-02 PROCEDURE — G8427 DOCREV CUR MEDS BY ELIG CLIN: HCPCS | Performed by: FAMILY MEDICINE

## 2024-10-02 PROCEDURE — G8484 FLU IMMUNIZE NO ADMIN: HCPCS | Performed by: FAMILY MEDICINE

## 2024-10-02 SDOH — ECONOMIC STABILITY: FOOD INSECURITY: WITHIN THE PAST 12 MONTHS, THE FOOD YOU BOUGHT JUST DIDN'T LAST AND YOU DIDN'T HAVE MONEY TO GET MORE.: NEVER TRUE

## 2024-10-02 SDOH — ECONOMIC STABILITY: FOOD INSECURITY: WITHIN THE PAST 12 MONTHS, YOU WORRIED THAT YOUR FOOD WOULD RUN OUT BEFORE YOU GOT MONEY TO BUY MORE.: NEVER TRUE

## 2024-10-02 SDOH — ECONOMIC STABILITY: INCOME INSECURITY: HOW HARD IS IT FOR YOU TO PAY FOR THE VERY BASICS LIKE FOOD, HOUSING, MEDICAL CARE, AND HEATING?: NOT HARD AT ALL

## 2024-10-03 ENCOUNTER — PATIENT MESSAGE (OUTPATIENT)
Dept: FAMILY MEDICINE CLINIC | Age: 49
End: 2024-10-03

## 2024-10-03 RX ORDER — METHYLPREDNISOLONE 4 MG
TABLET, DOSE PACK ORAL
Qty: 1 KIT | Refills: 0 | Status: SHIPPED | OUTPATIENT
Start: 2024-10-03 | End: 2024-10-09

## 2024-10-04 ASSESSMENT — ENCOUNTER SYMPTOMS
SORE THROAT: 1
COUGH: 1
DIARRHEA: 0
CHEST TIGHTNESS: 1
BACK PAIN: 0
NAUSEA: 0
RHINORRHEA: 1
VOMITING: 0
CONSTIPATION: 0
ABDOMINAL PAIN: 0

## 2024-10-05 NOTE — PROGRESS NOTES
[facility]  [facility]  [address] [address]  Dept: [phone]  Dept Fax: [phone]  Loc: [phone]  PROGRESS NOTE      VisitDate: 10/2/2024    Cayla Dave is a 49 y.o. female who presents today for:  Chief Complaint   Patient presents with    Cough     Since Sunday- Coughing up yellow/green mucus, chest pain from the coughing. Does have a sore throat and runny nose as well. She had a fever of 102 Monday.        Impression/Plan:  1. Acute bronchitis, unspecified organism    2. Acute cough    3. Acute pharyngitis, unspecified etiology    4. Moderate episode of recurrent major depressive disorder (HCC)      Requested Prescriptions     Signed Prescriptions Disp Refills    amoxicillin-clavulanate (AUGMENTIN) 875-125 MG per tablet 20 tablet 0     Sig: Take 1 tablet by mouth 2 times daily for 10 days     Orders Placed This Encounter   Procedures    POCT COVID-19 Rapid, NAAT     Order Specific Question:   Date of Symptom Onset     Answer:   9/29/2024     Order Specific Question:   Reason for Test     Answer:   Symptomatic         Subjective:  HPI  Cough and sore throat for several days, COVID-negative.  No relief with over-the-counter medications.  Depression is stable    Review of Systems   Constitutional:  Negative for chills, fatigue and fever.   HENT:  Positive for postnasal drip, rhinorrhea and sore throat. Negative for congestion and ear pain.    Respiratory:  Positive for cough and chest tightness.    Cardiovascular:  Negative for chest pain, palpitations and leg swelling.   Gastrointestinal:  Negative for abdominal pain, constipation, diarrhea, nausea and vomiting.   Genitourinary:  Negative for dysuria, frequency and hematuria.   Musculoskeletal:  Negative for arthralgias, back pain and joint swelling.   Skin:  Negative for rash.   Neurological:  Negative for dizziness, light-headedness and headaches.     Past Medical History:

## 2024-10-07 ENCOUNTER — HOSPITAL ENCOUNTER (EMERGENCY)
Age: 49
Discharge: HOME | End: 2024-10-07
Attending: EMERGENCY MEDICINE
Payer: COMMERCIAL

## 2024-10-07 ENCOUNTER — APPOINTMENT (OUTPATIENT)
Dept: GENERAL RADIOLOGY | Age: 49
End: 2024-10-07
Payer: COMMERCIAL

## 2024-10-07 VITALS
WEIGHT: 224 LBS | HEART RATE: 96 BPM | TEMPERATURE: 100.1 F | DIASTOLIC BLOOD PRESSURE: 85 MMHG | BODY MASS INDEX: 35.16 KG/M2 | SYSTOLIC BLOOD PRESSURE: 129 MMHG | OXYGEN SATURATION: 98 % | RESPIRATION RATE: 18 BRPM | HEIGHT: 67 IN

## 2024-10-07 DIAGNOSIS — J18.9 PNEUMONIA OF BOTH LOWER LOBES DUE TO INFECTIOUS ORGANISM: Primary | ICD-10-CM

## 2024-10-07 LAB
ALBUMIN SERPL BCG-MCNC: 3.9 G/DL (ref 3.5–5.1)
ALP SERPL-CCNC: 146 U/L (ref 38–126)
ALT SERPL W/O P-5'-P-CCNC: 18 U/L (ref 11–66)
ANION GAP SERPL CALC-SCNC: 15 MEQ/L (ref 8–16)
AST SERPL-CCNC: 17 U/L (ref 5–40)
BACTERIA: ABNORMAL
BASOPHILS ABSOLUTE: 0 THOU/MM3 (ref 0–0.1)
BASOPHILS NFR BLD AUTO: 0.2 %
BILIRUB SERPL-MCNC: 0.2 MG/DL (ref 0.3–1.2)
BILIRUB UR QL STRIP: NEGATIVE
BUN SERPL-MCNC: 9 MG/DL (ref 7–22)
CALCIUM SERPL-MCNC: 8.7 MG/DL (ref 8.5–10.5)
CASTS #/AREA URNS LPF: ABNORMAL /LPF
CASTS #/AREA URNS LPF: ABNORMAL /LPF
CHARACTER UR: CLEAR
CHARCOAL URNS QL MICRO: ABNORMAL
CHLORIDE SERPL-SCNC: 99 MEQ/L (ref 98–111)
CO2 SERPL-SCNC: 24 MEQ/L (ref 23–33)
COLOR UR: YELLOW
CREAT SERPL-MCNC: 0.7 MG/DL (ref 0.4–1.2)
CRYSTALS URNS QL MICRO: ABNORMAL
DEPRECATED RDW RBC AUTO: 40.7 FL (ref 35–45)
EOSINOPHIL NFR BLD AUTO: 0.6 %
EOSINOPHILS ABSOLUTE: 0.1 THOU/MM3 (ref 0–0.4)
EPITHELIAL CELLS, UA: ABNORMAL /HPF
ERYTHROCYTE [DISTWIDTH] IN BLOOD BY AUTOMATED COUNT: 13 % (ref 11.5–14.5)
FLUAV RNA RESP QL NAA+PROBE: NOT DETECTED
FLUBV RNA RESP QL NAA+PROBE: NOT DETECTED
GFR SERPL CREATININE-BSD FRML MDRD: > 90 ML/MIN/1.73M2
GLUCOSE SERPL-MCNC: 96 MG/DL (ref 70–108)
GLUCOSE UR QL STRIP.AUTO: NEGATIVE MG/DL
HCT VFR BLD AUTO: 41 % (ref 37–47)
HGB BLD-MCNC: 13.4 GM/DL (ref 12–16)
HGB UR QL STRIP.AUTO: NEGATIVE
IMM GRANULOCYTES # BLD AUTO: 0.03 THOU/MM3 (ref 0–0.07)
IMM GRANULOCYTES NFR BLD AUTO: 0.3 %
KETONES UR QL STRIP.AUTO: NEGATIVE
LACTATE SERPL-SCNC: 1.3 MMOL/L (ref 0.5–2)
LEUKOCYTE ESTERASE UR QL STRIP.AUTO: NEGATIVE
LYMPHOCYTES ABSOLUTE: 1.9 THOU/MM3 (ref 1–4.8)
LYMPHOCYTES NFR BLD AUTO: 19.3 %
MCH RBC QN AUTO: 27.9 PG (ref 26–33)
MCHC RBC AUTO-ENTMCNC: 32.7 GM/DL (ref 32.2–35.5)
MCV RBC AUTO: 85.4 FL (ref 81–99)
MONOCYTES ABSOLUTE: 0.6 THOU/MM3 (ref 0.4–1.3)
MONOCYTES NFR BLD AUTO: 6.1 %
NEUTROPHILS ABSOLUTE: 7.1 THOU/MM3 (ref 1.8–7.7)
NEUTROPHILS NFR BLD AUTO: 73.5 %
NITRITE UR QL STRIP.AUTO: NEGATIVE
NRBC BLD AUTO-RTO: 0 /100 WBC
OSMOLALITY SERPL CALC.SUM OF ELEC: 274.2 MOSMOL/KG (ref 275–300)
PH UR STRIP.AUTO: 6.5 [PH] (ref 5–9)
PLATELET # BLD AUTO: 325 THOU/MM3 (ref 130–400)
PMV BLD AUTO: 9 FL (ref 9.4–12.4)
POTASSIUM SERPL-SCNC: 3.3 MEQ/L (ref 3.5–5.2)
PROT SERPL-MCNC: 7.6 G/DL (ref 6.1–8)
PROT UR STRIP.AUTO-MCNC: ABNORMAL MG/DL
RBC # BLD AUTO: 4.8 MILL/MM3 (ref 4.2–5.4)
RBC #/AREA URNS HPF: ABNORMAL /HPF
RENAL EPI CELLS #/AREA URNS HPF: ABNORMAL /[HPF]
S PYO AG THROAT QL: NEGATIVE
S PYO THROAT QL CULT: NORMAL
SARS-COV-2 RNA RESP QL NAA+PROBE: NOT DETECTED
SODIUM SERPL-SCNC: 138 MEQ/L (ref 135–145)
SP GR UR REFRACT.AUTO: 1.02 (ref 1–1.03)
TROPONIN, HIGH SENSITIVITY: < 6 NG/L (ref 0–12)
UROBILINOGEN UR QL STRIP.AUTO: 0.2 EU/DL (ref 0–1)
WBC # BLD AUTO: 9.6 THOU/MM3 (ref 4.8–10.8)
WBC #/AREA URNS HPF: ABNORMAL /HPF
YEAST LIKE FUNGI URNS QL MICRO: ABNORMAL

## 2024-10-07 PROCEDURE — 96365 THER/PROPH/DIAG IV INF INIT: CPT

## 2024-10-07 PROCEDURE — 93005 ELECTROCARDIOGRAM TRACING: CPT

## 2024-10-07 PROCEDURE — 81001 URINALYSIS AUTO W/SCOPE: CPT

## 2024-10-07 PROCEDURE — 87636 SARSCOV2 & INF A&B AMP PRB: CPT

## 2024-10-07 PROCEDURE — 99285 EMERGENCY DEPT VISIT HI MDM: CPT

## 2024-10-07 PROCEDURE — 2580000003 HC RX 258

## 2024-10-07 PROCEDURE — 87880 STREP A ASSAY W/OPTIC: CPT

## 2024-10-07 PROCEDURE — 96375 TX/PRO/DX INJ NEW DRUG ADDON: CPT

## 2024-10-07 PROCEDURE — 36415 COLL VENOUS BLD VENIPUNCTURE: CPT

## 2024-10-07 PROCEDURE — 87070 CULTURE OTHR SPECIMN AEROBIC: CPT

## 2024-10-07 PROCEDURE — 83605 ASSAY OF LACTIC ACID: CPT

## 2024-10-07 PROCEDURE — 87040 BLOOD CULTURE FOR BACTERIA: CPT

## 2024-10-07 PROCEDURE — 84484 ASSAY OF TROPONIN QUANT: CPT

## 2024-10-07 PROCEDURE — 80053 COMPREHEN METABOLIC PANEL: CPT

## 2024-10-07 PROCEDURE — 6360000002 HC RX W HCPCS

## 2024-10-07 PROCEDURE — 85025 COMPLETE CBC W/AUTO DIFF WBC: CPT

## 2024-10-07 PROCEDURE — 71046 X-RAY EXAM CHEST 2 VIEWS: CPT

## 2024-10-07 RX ORDER — CEFUROXIME AXETIL 500 MG/1
500 TABLET ORAL 2 TIMES DAILY
Qty: 14 TABLET | Refills: 0 | Status: SHIPPED | OUTPATIENT
Start: 2024-10-07 | End: 2024-10-14

## 2024-10-07 RX ORDER — AZITHROMYCIN 250 MG/1
TABLET, FILM COATED ORAL
Qty: 6 TABLET | Refills: 0 | Status: SHIPPED | OUTPATIENT
Start: 2024-10-07 | End: 2024-10-17

## 2024-10-07 RX ADMIN — AZITHROMYCIN MONOHYDRATE 500 MG: 500 INJECTION, POWDER, LYOPHILIZED, FOR SOLUTION INTRAVENOUS at 13:56

## 2024-10-07 RX ADMIN — WATER 1000 MG: 1 INJECTION INTRAMUSCULAR; INTRAVENOUS; SUBCUTANEOUS at 13:53

## 2024-10-07 ASSESSMENT — PAIN - FUNCTIONAL ASSESSMENT
PAIN_FUNCTIONAL_ASSESSMENT: NONE - DENIES PAIN
PAIN_FUNCTIONAL_ASSESSMENT: NONE - DENIES PAIN

## 2024-10-07 NOTE — ED PROVIDER NOTES
COPD/Asthma/Emphysema, Pneumonia, pneumothorax, pulmonary edema, pleural effusion or pulmonary embolism  Cardiac causes: Heart failure, ACS, cardiac arrhythmias  Toxins: Cyanide, Carbon monoxide, drugs, DKA  Neurologic causes: Guillain-Barré , stroke        Decision Rules/Clinical Scores utilized: Curb 65 0      Code Status:  Not addressed during this ED visit    Social determinants of health impacting treatment or disposition:  Considered and not applicable     MIPS documentation:  N/A    Medical Decision Making    Chest x-ray imaging the patient showed left mid and bilateral lower lung atelectasis/infiltrates.  Given this finding, and correlation with the patient's physical exam findings and laboratory studies presented here in the ED, the decision was made to discharge the patient home with education discontinuing Augmentin ordered by PCP, and initiation of azithromycin and cefuroxime therapy at home.  Curb 65 restratification utilized to determine the patient is a safe candidate for discharge home and outpatient follow-up with PCP.  The patient understood these instructions and new prescriptions, without issue    ED COURSE   ED Medications administered this visit (None if left blank):   Medications   azithromycin (ZITHROMAX) 500 mg in sodium chloride 0.9 % 250 mL IVPB ([id number]) (500 mg IntraVENous New Bag 10/7/24 1356)   cefTRIAXone (ROCEPHIN) 1,000 mg in sterile water 10 mL IV syringe (1,000 mg IntraVENous Given 10/7/24 1353)         ED Course as of 10/07/24 1434   Mon Oct 07, 2024   1345 EKG at 1308 shows sinus tachycardia with a rate of 102 beats per minute, artifact present, no acute ST segment changes.   [MM]      ED Course User Index  [MM] Rey Ramos DO         PROCEDURES: (None if blank)  Procedures:     CRITICAL CARE:  None    MEDICATION CHANGES     New Prescriptions    No medications on file         FINAL DISPOSITION   Shared Decision-Making was performed, disposition discussed with the

## 2024-10-07 NOTE — ED NOTES
Pt medicated per MAR. Pt VS stable. Respirations even and unlabored. Call light within reach. Pt denies further needs at this time.

## 2024-10-07 NOTE — ED TRIAGE NOTES
Pt to ED c/o shortness of breath. Pt states she was at the doctors Wednesday and was diagnosed with bronchitis. Pt states her daughter had pneumonia a week ago. Pt states she also has a fever she can't break. Pt states she took ibuprofen and tylenol 30min ago. Pt A&O. Labs collected and EKG completed

## 2024-10-08 LAB
EKG ATRIAL RATE: 102 BPM
EKG P AXIS: 52 DEGREES
EKG P-R INTERVAL: 126 MS
EKG Q-T INTERVAL: 330 MS
EKG QRS DURATION: 86 MS
EKG QTC CALCULATION (BAZETT): 430 MS
EKG R AXIS: 17 DEGREES
EKG T AXIS: 1 DEGREES
EKG VENTRICULAR RATE: 102 BPM

## 2024-10-08 PROCEDURE — 93010 ELECTROCARDIOGRAM REPORT: CPT | Performed by: INTERNAL MEDICINE

## 2024-10-09 LAB — BACTERIA THROAT AEROBE CULT: NORMAL

## 2024-10-10 ENCOUNTER — PATIENT MESSAGE (OUTPATIENT)
Dept: FAMILY MEDICINE CLINIC | Age: 49
End: 2024-10-10

## 2024-10-12 LAB
BACTERIA BLD AEROBE CULT: NORMAL
BACTERIA BLD AEROBE CULT: NORMAL

## 2024-10-15 ENCOUNTER — OFFICE VISIT (OUTPATIENT)
Dept: FAMILY MEDICINE CLINIC | Age: 49
End: 2024-10-15
Payer: COMMERCIAL

## 2024-10-15 VITALS
HEART RATE: 103 BPM | DIASTOLIC BLOOD PRESSURE: 70 MMHG | BODY MASS INDEX: 35.37 KG/M2 | OXYGEN SATURATION: 99 % | TEMPERATURE: 99.5 F | WEIGHT: 225.8 LBS | SYSTOLIC BLOOD PRESSURE: 124 MMHG

## 2024-10-15 DIAGNOSIS — J18.9 PNEUMONIA OF LEFT LOWER LOBE DUE TO INFECTIOUS ORGANISM: Primary | ICD-10-CM

## 2024-10-15 DIAGNOSIS — J45.909 MODERATE ASTHMA, UNSPECIFIED WHETHER COMPLICATED, UNSPECIFIED WHETHER PERSISTENT: ICD-10-CM

## 2024-10-15 PROCEDURE — G8484 FLU IMMUNIZE NO ADMIN: HCPCS | Performed by: FAMILY MEDICINE

## 2024-10-15 PROCEDURE — G8427 DOCREV CUR MEDS BY ELIG CLIN: HCPCS | Performed by: FAMILY MEDICINE

## 2024-10-15 PROCEDURE — 1036F TOBACCO NON-USER: CPT | Performed by: FAMILY MEDICINE

## 2024-10-15 PROCEDURE — 99214 OFFICE O/P EST MOD 30 MIN: CPT | Performed by: FAMILY MEDICINE

## 2024-10-15 PROCEDURE — G8417 CALC BMI ABV UP PARAM F/U: HCPCS | Performed by: FAMILY MEDICINE

## 2024-10-15 RX ORDER — ALBUTEROL SULFATE 0.83 MG/ML
2.5 SOLUTION RESPIRATORY (INHALATION) EVERY 6 HOURS PRN
Qty: 25 EACH | Refills: 1 | Status: SHIPPED | OUTPATIENT
Start: 2024-10-15

## 2024-10-15 ASSESSMENT — ENCOUNTER SYMPTOMS
RHINORRHEA: 0
NAUSEA: 0
SORE THROAT: 0
WHEEZING: 1
EYES NEGATIVE: 1
COUGH: 1
CHEST TIGHTNESS: 0
VOMITING: 0
DIARRHEA: 0
BACK PAIN: 0
SHORTNESS OF BREATH: 1
ABDOMINAL PAIN: 0

## 2024-10-15 NOTE — PROGRESS NOTES
[facility]  [address] [address]  Dept: [phone]  Dept Fax: [phone]  Loc: [phone]  PROGRESS NOTE      VisitDate: 10/15/2024    Cayla Dave is a 49 y.o. female who presents today for:  Chief Complaint   Patient presents with    Shortness of Breath    Follow-up     [facility] ED 10/7; states she feels better just has a lingering cough.    Results     EKG done in hospital; patient would like to discuss     Orders     Nebulizer; had one; son took it to college    Diarrhea     Has been using imodium with no relief        Impression/Plan:  1. Pneumonia of left lower lobe due to infectious organism    2. Moderate asthma, unspecified whether complicated, unspecified whether persistent      Requested Prescriptions     Signed Prescriptions Disp Refills    albuterol (PROVENTIL) (2.5 MG/3ML) 0.083% nebulizer solution 25 each 1     Sig: Take 3 mLs by nebulization every 6 hours as needed for Wheezing     Orders Placed This Encounter   Procedures    DME Order for Nebulizer as OP     You must complete the order parameters below and add the medical necessity documentation for this DME in a separate note.    Nebulizer with compressor  Disposable Med Nebs 2 per month  Reusable Med Nebs 1 per 6 months  Aerosol Mask 1 per month  Replacement Filters 2 per month  All other related supplies as needed per month    Frequency:  Four times daily    Diagnosis: asthma, pneumonia    Length of Need: 12 months     Order Specific Question:   Medications being used:     Answer:   Albuterol .083 unit dose vial         Subjective:  History of Present Illness  The patient is a 49-year-old female here for ER follow-up.    She recently visited the ER due to pneumonia. During her stay, she was prescribed Zithromax and Ceftin, both of which she is completing. She was also administered two IV antibiotics, one of which was Zithromax. She was advised to discontinue

## 2024-10-17 ENCOUNTER — PATIENT MESSAGE (OUTPATIENT)
Dept: FAMILY MEDICINE CLINIC | Age: 49
End: 2024-10-17

## 2024-10-17 RX ORDER — DOXYCYCLINE HYCLATE 100 MG
100 TABLET ORAL 2 TIMES DAILY
Qty: 20 TABLET | Refills: 0 | Status: SHIPPED | OUTPATIENT
Start: 2024-10-17 | End: 2024-10-27

## 2024-11-04 ENCOUNTER — OFFICE VISIT (OUTPATIENT)
Dept: FAMILY MEDICINE CLINIC | Age: 49
End: 2024-11-04

## 2024-11-04 VITALS
HEART RATE: 103 BPM | BODY MASS INDEX: 34.82 KG/M2 | DIASTOLIC BLOOD PRESSURE: 80 MMHG | OXYGEN SATURATION: 97 % | TEMPERATURE: 99.1 F | WEIGHT: 222.3 LBS | SYSTOLIC BLOOD PRESSURE: 128 MMHG

## 2024-11-04 DIAGNOSIS — Z86.69 HISTORY OF TENSION HEADACHE: ICD-10-CM

## 2024-11-04 DIAGNOSIS — N95.1 MENOPAUSAL SYMPTOMS: Primary | ICD-10-CM

## 2024-11-04 RX ORDER — BUTALBITAL, ACETAMINOPHEN AND CAFFEINE 50; 325; 40 MG/1; MG/1; MG/1
TABLET ORAL
Qty: 90 TABLET | Refills: 0 | Status: SHIPPED | OUTPATIENT
Start: 2024-11-04

## 2024-11-04 NOTE — PROGRESS NOTES
Immunizations Administered       Name Date Dose Route    Influenza, FLUCELVAX, (age 6 mo+) IM, Trivalent PF, 0.5mL 11/4/2024 0.5 mL Intramuscular    Site: Deltoid- Right    Lot: [id number]    NDC: 85505-681-41    Pneumococcal, PCV20, PREVNAR 20, (age 6w+), IM, 0.5mL 11/4/2024 0.5 mL Intramuscular    Site: Deltoid- Left    Lot: [id number]    NDC: 7692-6770-97

## 2024-11-05 LAB
ALBUMIN: 4.2 G/DL (ref 3.5–5.2)
ALK PHOSPHATASE: 139 U/L (ref 30–103)
ALT SERPL-CCNC: 21 U/L (ref 5–33)
ANION GAP SERPL CALCULATED.3IONS-SCNC: 15 MMOL/L (ref 7–16)
AST SERPL-CCNC: 17 U/L (ref 9–40)
BILIRUB SERPL-MCNC: <0.2 MG/DL
BUN BLDV-MCNC: 9 MG/DL (ref 6–20)
CALCIUM SERPL-MCNC: 9.2 MG/DL (ref 8.6–10.5)
CHLORIDE BLD-SCNC: 103 MMOL/L (ref 96–107)
CO2: 23 MMOL/L (ref 18–32)
CREAT SERPL-MCNC: 0.85 MG/DL (ref 0.51–1.15)
EGFR IF NONAFRICAN AMERICAN: 85 ML/MIN/1.73M2
ESTRADIOL LEVEL: 146 PG/ML
FOLLICLE STIMULATING HORMONE: 19.8 MIU/ML
GLUCOSE: 70 MG/DL (ref 70–100)
LH: 44.5 MIU/ML
MAGNESIUM: 2 MG/DL (ref 1.6–2.6)
POTASSIUM SERPL-SCNC: 3.9 MMOL/L (ref 3.5–5.4)
SODIUM BLD-SCNC: 141 MMOL/L (ref 135–148)
T4 FREE: 1.06 NG/DL (ref 0.8–1.9)
TOTAL PROTEIN: 7 G/DL (ref 6–8.3)
TSH SERPL DL<=0.05 MIU/L-ACNC: 2.44 UIU/ML (ref 0.27–4.2)

## 2024-11-05 ASSESSMENT — ENCOUNTER SYMPTOMS
ABDOMINAL PAIN: 0
DIARRHEA: 0
SORE THROAT: 0
BACK PAIN: 0
CHEST TIGHTNESS: 0
VOMITING: 0
RHINORRHEA: 0
NAUSEA: 0
EYES NEGATIVE: 1
SHORTNESS OF BREATH: 0
COUGH: 0

## 2024-11-05 NOTE — PROGRESS NOTES
[facility] PROFESSIONAL SERVS  [facility]  [address] [address]  Dept: [phone]  Dept Fax: [phone]  Loc: [phone]  PROGRESS NOTE      VisitDate: 11/4/2024    Cayla Dave is a 49 y.o. female who presents today for:  Chief Complaint   Patient presents with    Sweats     Excessive sweating with heart fluttering, emotions are becoming harder to control. Is having hot flashes. Has been an issue for about a month or two; seems to be recently getting worse. Patient no longer has period she did have an ablation     Flu Vaccine     4 wk out from pneumonia; wants checked before vaccine        Impression/Plan:  1. Menopausal symptoms    2. History of tension headache      Requested Prescriptions     Signed Prescriptions Disp Refills    butalbital-acetaminophen-caffeine (FIORICET, ESGIC) -40 MG per tablet 90 tablet 0     Sig: Take 1 to 2 tablets by  Mouth every 6 hours as needed for headache     Orders Placed This Encounter   Procedures    Influenza, FLUCELVAX Trivalent, (age 6 mo+) IM, Preservative Free, 0.5mL    Pneumococcal, PCV20, PREVNAR 20, (age 6w+), IM, PF    Comprehensive Metabolic Panel     Standing Status:   Future     Standing Expiration Date:   11/4/2025    Magnesium     Standing Status:   Future     Standing Expiration Date:   11/4/2025    T4, Free     Standing Status:   Future     Standing Expiration Date:   11/4/2025    TSH     Standing Status:   Future     Standing Expiration Date:   11/4/2025    Estradiol     Standing Status:   Future     Standing Expiration Date:   11/4/2025    Follicle Stimulating Hormone     Standing Status:   Future     Standing Expiration Date:   11/4/2025    Luteinizing Hormone     Standing Status:   Future     Standing Expiration Date:   11/4/2025    Comprehensive Metabolic Panel    Magnesium    TSH    T4, Free    Luteinizing Hormone    Follicle Stimulating Hormone    Estradiol         Subjective:  History of

## 2024-11-05 NOTE — RESULT ENCOUNTER NOTE
Your recent lab results are normal.Inform patient that labs look okay would indicate that she is not postmenopausal

## 2024-11-18 RX ORDER — CYCLOBENZAPRINE HCL 10 MG
10 TABLET ORAL EVERY 8 HOURS PRN
Qty: 30 TABLET | Refills: 1 | Status: SHIPPED | OUTPATIENT
Start: 2024-11-18

## 2024-11-18 RX ORDER — HYDROXYZINE HYDROCHLORIDE 25 MG/1
25 TABLET, FILM COATED ORAL NIGHTLY PRN
Qty: 30 TABLET | Refills: 1 | Status: SHIPPED | OUTPATIENT
Start: 2024-11-18

## 2024-12-03 DIAGNOSIS — Z86.69 HISTORY OF TENSION HEADACHE: ICD-10-CM

## 2024-12-03 RX ORDER — BUTALBITAL, ACETAMINOPHEN AND CAFFEINE 50; 325; 40 MG/1; MG/1; MG/1
TABLET ORAL
Qty: 90 TABLET | Refills: 0 | Status: SHIPPED | OUTPATIENT
Start: 2024-12-03

## 2024-12-23 DIAGNOSIS — Z86.69 HISTORY OF TENSION HEADACHE: ICD-10-CM

## 2024-12-23 RX ORDER — QUETIAPINE FUMARATE 50 MG/1
50 TABLET, EXTENDED RELEASE ORAL NIGHTLY
Qty: 30 TABLET | Refills: 0 | Status: SHIPPED | OUTPATIENT
Start: 2024-12-23

## 2024-12-24 RX ORDER — BUTALBITAL, ACETAMINOPHEN AND CAFFEINE 50; 325; 40 MG/1; MG/1; MG/1
TABLET ORAL
Qty: 90 TABLET | Refills: 2 | Status: SHIPPED | OUTPATIENT
Start: 2024-12-24

## 2024-12-24 NOTE — TELEPHONE ENCOUNTER
Pharmacy states patient did  90 tab on 12/03.     Called patient back and she states she has been having at least 3 headaches a week so she has been using it more. She has been trying ice, heat and Biofreeze but cannot take anything with Asprin in it or ibuprofen.

## 2024-12-24 NOTE — TELEPHONE ENCOUNTER
LOV 11/04/2024     WILL CALL PHARMACY WHEN IT OPENS TO CHECK IF THEY HAVE THE ONE ON FILE FROM 12/03/2024    Patient states they said they didn't have anything for her.

## 2025-01-22 ENCOUNTER — OFFICE VISIT (OUTPATIENT)
Dept: FAMILY MEDICINE CLINIC | Age: 50
End: 2025-01-22

## 2025-01-22 ENCOUNTER — PATIENT MESSAGE (OUTPATIENT)
Dept: FAMILY MEDICINE CLINIC | Age: 50
End: 2025-01-22

## 2025-01-22 VITALS
HEART RATE: 93 BPM | DIASTOLIC BLOOD PRESSURE: 70 MMHG | TEMPERATURE: 99 F | BODY MASS INDEX: 34.14 KG/M2 | SYSTOLIC BLOOD PRESSURE: 106 MMHG | WEIGHT: 218 LBS | OXYGEN SATURATION: 96 % | RESPIRATION RATE: 16 BRPM

## 2025-01-22 DIAGNOSIS — G43.909 MIGRAINE WITHOUT STATUS MIGRAINOSUS, NOT INTRACTABLE, UNSPECIFIED MIGRAINE TYPE: ICD-10-CM

## 2025-01-22 DIAGNOSIS — M77.11 LATERAL EPICONDYLITIS OF RIGHT ELBOW: Primary | ICD-10-CM

## 2025-01-22 RX ORDER — METHYLPREDNISOLONE ACETATE 80 MG/ML
160 INJECTION, SUSPENSION INTRA-ARTICULAR; INTRALESIONAL; INTRAMUSCULAR; SOFT TISSUE ONCE
Status: COMPLETED | OUTPATIENT
Start: 2025-01-22 | End: 2025-01-22

## 2025-01-22 RX ORDER — QUETIAPINE FUMARATE 50 MG/1
50 TABLET, EXTENDED RELEASE ORAL NIGHTLY
COMMUNITY

## 2025-01-22 RX ORDER — QUETIAPINE FUMARATE 50 MG/1
50 TABLET, EXTENDED RELEASE ORAL NIGHTLY
Qty: 30 TABLET | Refills: 0 | Status: CANCELLED | OUTPATIENT
Start: 2025-01-22

## 2025-01-22 RX ORDER — SUMATRIPTAN SUCCINATE 100 MG/1
100 TABLET ORAL 2 TIMES DAILY PRN
Qty: 9 TABLET | Refills: 5 | Status: SHIPPED | OUTPATIENT
Start: 2025-01-22

## 2025-01-22 RX ORDER — HYDROXYZINE HYDROCHLORIDE 25 MG/1
25 TABLET, FILM COATED ORAL NIGHTLY PRN
Qty: 30 TABLET | Refills: 1 | Status: SHIPPED | OUTPATIENT
Start: 2025-01-22

## 2025-01-22 RX ADMIN — METHYLPREDNISOLONE ACETATE 160 MG: 80 INJECTION, SUSPENSION INTRA-ARTICULAR; INTRALESIONAL; INTRAMUSCULAR; SOFT TISSUE at 12:18

## 2025-01-22 SDOH — ECONOMIC STABILITY: FOOD INSECURITY: WITHIN THE PAST 12 MONTHS, THE FOOD YOU BOUGHT JUST DIDN'T LAST AND YOU DIDN'T HAVE MONEY TO GET MORE.: NEVER TRUE

## 2025-01-22 SDOH — ECONOMIC STABILITY: FOOD INSECURITY: WITHIN THE PAST 12 MONTHS, YOU WORRIED THAT YOUR FOOD WOULD RUN OUT BEFORE YOU GOT MONEY TO BUY MORE.: NEVER TRUE

## 2025-01-22 ASSESSMENT — PATIENT HEALTH QUESTIONNAIRE - PHQ9
10. IF YOU CHECKED OFF ANY PROBLEMS, HOW DIFFICULT HAVE THESE PROBLEMS MADE IT FOR YOU TO DO YOUR WORK, TAKE CARE OF THINGS AT HOME, OR GET ALONG WITH OTHER PEOPLE: NOT DIFFICULT AT ALL
3. TROUBLE FALLING OR STAYING ASLEEP: NOT AT ALL
SUM OF ALL RESPONSES TO PHQ QUESTIONS 1-9: 0
SUM OF ALL RESPONSES TO PHQ QUESTIONS 1-9: 0
6. FEELING BAD ABOUT YOURSELF - OR THAT YOU ARE A FAILURE OR HAVE LET YOURSELF OR YOUR FAMILY DOWN: NOT AT ALL
2. FEELING DOWN, DEPRESSED OR HOPELESS: NOT AT ALL
1. LITTLE INTEREST OR PLEASURE IN DOING THINGS: NOT AT ALL
8. MOVING OR SPEAKING SO SLOWLY THAT OTHER PEOPLE COULD HAVE NOTICED. OR THE OPPOSITE, BEING SO FIGETY OR RESTLESS THAT YOU HAVE BEEN MOVING AROUND A LOT MORE THAN USUAL: NOT AT ALL
SUM OF ALL RESPONSES TO PHQ QUESTIONS 1-9: 0
SUM OF ALL RESPONSES TO PHQ QUESTIONS 1-9: 0
9. THOUGHTS THAT YOU WOULD BE BETTER OFF DEAD, OR OF HURTING YOURSELF: NOT AT ALL
SUM OF ALL RESPONSES TO PHQ9 QUESTIONS 1 & 2: 0
4. FEELING TIRED OR HAVING LITTLE ENERGY: NOT AT ALL
5. POOR APPETITE OR OVEREATING: NOT AT ALL
7. TROUBLE CONCENTRATING ON THINGS, SUCH AS READING THE NEWSPAPER OR WATCHING TELEVISION: NOT AT ALL

## 2025-01-22 NOTE — PROGRESS NOTES
Administrations This Visit       methylPREDNISolone acetate (DEPO-MEDROL) injection 160 mg       Admin Date  01/22/2025  12:18 Action  Given Dose  160 mg Route  IntraMUSCular Site  Deltoid Right Documented By  Yamini Guillermo RN    NDC: [address]-6    Lot#: [id number]    : AltspaceVR    Patient Supplied?: No

## 2025-01-23 ASSESSMENT — ENCOUNTER SYMPTOMS
BACK PAIN: 0
RHINORRHEA: 0
DIARRHEA: 0
ABDOMINAL PAIN: 0
CHEST TIGHTNESS: 0
SORE THROAT: 0
SHORTNESS OF BREATH: 0
EYES NEGATIVE: 1
VOMITING: 0
COUGH: 0
NAUSEA: 0

## 2025-01-23 NOTE — PROGRESS NOTES
[facility]  [address] [address]  Dept: [phone]  Dept Fax: [phone]  Loc: [phone]  PROGRESS NOTE      VisitDate: 1/22/2025    Cayla Dave is a 49 y.o. female who presents today for:  Chief Complaint   Patient presents with    Elbow Pain     Right elbow pain x2 weeks when she lifts anything even a coffee mug she will get a sharp pain throughout her elbow.    Migraine     She has been having more migraines and tension headaches.    Immunizations     tdap       Impression/Plan:  1. Lateral epicondylitis of right elbow    2. Migraine without status migrainosus, not intractable, unspecified migraine type      Requested Prescriptions     Signed Prescriptions Disp Refills    hydrOXYzine HCl (ATARAX) 25 MG tablet 30 tablet 1     Sig: Take 1 tablet by mouth nightly as needed for Itching    SUMAtriptan (IMITREX) 100 MG tablet 9 tablet 5     Sig: Take 1 tablet by mouth 2 times daily as needed for Migraine     No orders of the defined types were placed in this encounter.    Administrations This Visit       methylPREDNISolone acetate (DEPO-MEDROL) injection 160 mg       Admin Date  01/22/2025  12:18 Action  Given Dose  160 mg Route  IntraMUSCular Site  Deltoid Right Documented By  Yamini Guillermo RN    NDC: [address]-2    Lot#: [id number]    : Fangcang    Patient Supplied?: No                    Subjective:  History of Present Illness  The patient is a 49-year-old female who presents for evaluation of elbow pain, migraine, and medication management.    She has been experiencing significant pain in her elbow for the past few weeks, which is exacerbated by lifting objects. The pain is described as sharp and shooting, localized to the outside of the elbow. She reports no associated numbness or tingling. There is no recollection of any specific injury that could have precipitated this condition. Even simple tasks

## 2025-01-30 ENCOUNTER — TRANSCRIBE ORDERS (OUTPATIENT)
Dept: ADMINISTRATIVE | Age: 50
End: 2025-01-30

## 2025-01-30 ENCOUNTER — LAB (OUTPATIENT)
Dept: LAB | Age: 50
End: 2025-01-30

## 2025-01-30 DIAGNOSIS — Z12.31 OTHER SCREENING MAMMOGRAM: Primary | ICD-10-CM

## 2025-02-10 LAB — CYTOLOGY THIN PREP PAP: NORMAL

## 2025-02-24 ENCOUNTER — OFFICE VISIT (OUTPATIENT)
Dept: FAMILY MEDICINE CLINIC | Age: 50
End: 2025-02-24
Payer: COMMERCIAL

## 2025-02-24 VITALS
RESPIRATION RATE: 16 BRPM | BODY MASS INDEX: 33.19 KG/M2 | TEMPERATURE: 99.2 F | SYSTOLIC BLOOD PRESSURE: 122 MMHG | OXYGEN SATURATION: 96 % | WEIGHT: 219 LBS | HEIGHT: 68 IN | DIASTOLIC BLOOD PRESSURE: 72 MMHG | HEART RATE: 101 BPM

## 2025-02-24 DIAGNOSIS — Z00.00 WELL ADULT EXAM: Primary | ICD-10-CM

## 2025-02-24 DIAGNOSIS — F32.A DEPRESSION, UNSPECIFIED DEPRESSION TYPE: ICD-10-CM

## 2025-02-24 PROCEDURE — 99396 PREV VISIT EST AGE 40-64: CPT | Performed by: FAMILY MEDICINE

## 2025-02-24 PROCEDURE — 90471 IMMUNIZATION ADMIN: CPT | Performed by: FAMILY MEDICINE

## 2025-02-24 PROCEDURE — 90715 TDAP VACCINE 7 YRS/> IM: CPT | Performed by: FAMILY MEDICINE

## 2025-02-24 RX ORDER — ESCITALOPRAM OXALATE 20 MG/1
20 TABLET ORAL DAILY
Qty: 90 TABLET | Refills: 3 | Status: SHIPPED | OUTPATIENT
Start: 2025-02-24

## 2025-02-24 RX ORDER — ESTRADIOL 1 MG/1
TABLET ORAL
COMMUNITY
Start: 2025-01-30

## 2025-02-24 RX ORDER — MEDROXYPROGESTERONE ACETATE 5 MG
TABLET ORAL
COMMUNITY
Start: 2025-01-30

## 2025-02-24 RX ORDER — ATORVASTATIN CALCIUM 20 MG/1
20 TABLET, FILM COATED ORAL DAILY
Qty: 90 TABLET | Refills: 3 | Status: SHIPPED | OUTPATIENT
Start: 2025-02-24

## 2025-02-24 NOTE — PROGRESS NOTES
Immunization(s) given during visit:    Immunizations Administered       Name Date Dose Route    TDaP, ADACEL (age 10y-64y), BOOSTRIX (age 10y+), IM, 0.5mL 2/24/2025 0.5 mL Intramuscular    Site: Deltoid- Right    Lot:     NDC: 94305-780-31

## 2025-02-26 ASSESSMENT — ENCOUNTER SYMPTOMS
SORE THROAT: 0
NAUSEA: 0
RHINORRHEA: 0
DIARRHEA: 0
CHEST TIGHTNESS: 0
SHORTNESS OF BREATH: 0
ABDOMINAL PAIN: 0
COUGH: 0
BACK PAIN: 0
EYES NEGATIVE: 1
VOMITING: 0

## 2025-02-26 NOTE — PROGRESS NOTES
[facility] PROFESSIONAL [facility]  [address] [address]  Dept: [phone]  Dept Fax: [phone]  Loc: [phone]  PROGRESS NOTE      VisitDate: 2/24/2025    Cayla Dave is a 49 y.o. female who presents today for:  Chief Complaint   Patient presents with    Annual Exam     Discuss biometric labs-brought with her, GI will be doing US liver for fatty liver       Impression/Plan:  1. Well adult exam    2. Depression, unspecified depression type      Requested Prescriptions     Signed Prescriptions Disp Refills    escitalopram (LEXAPRO) 20 MG tablet 90 tablet 3     Sig: Take 1 tablet by mouth daily    atorvastatin (LIPITOR) 20 MG tablet 90 tablet 3     Sig: Take 1 tablet by mouth daily     Orders Placed This Encounter   Procedures    Tdap, BOOSTRIX, (age 10 yrs+), IM     Seen today for wellness visit.  Discussed the importance of a healthy life style.  Balanced diet, nutrition, physical activity,and injury prevention.  Also discussed the importance of up to date immunizations and annual screenings.      Subjective:  History of Present Illness  The patient is a 49-year-old female who presents for evaluation of fatty liver, alternating diarrhea and constipation, and medication management.    She has been diagnosed with fatty liver disease and is under the care of a gastroenterology nurse practitioner, Nahomy. An ultrasound of her liver is scheduled for 03/13/2025 to monitor the condition. She also mentions that blood work was conducted during her last visit, and the results are available on [employer].    She reports experiencing alternating episodes of diarrhea and constipation, accompanied by increased flatulence. She manages these symptoms with Gas-X and stool softeners. In cases of severe constipation, she resorts to MiraLAX. She expresses interest in incorporating probiotics into her regimen and seeks advice on the most effective type.    She is currently

## 2025-02-27 RX ORDER — QUETIAPINE FUMARATE 50 MG/1
50 TABLET, EXTENDED RELEASE ORAL NIGHTLY
Qty: 30 TABLET | Refills: 2 | Status: SHIPPED | OUTPATIENT
Start: 2025-02-27

## 2025-03-29 DIAGNOSIS — Z86.69 HISTORY OF TENSION HEADACHE: ICD-10-CM

## 2025-03-29 RX ORDER — ATORVASTATIN CALCIUM 20 MG/1
20 TABLET, FILM COATED ORAL DAILY
Qty: 90 TABLET | Refills: 3 | Status: CANCELLED | OUTPATIENT
Start: 2025-03-29

## 2025-03-31 RX ORDER — BUTALBITAL, ACETAMINOPHEN AND CAFFEINE 50; 325; 40 MG/1; MG/1; MG/1
TABLET ORAL
Qty: 90 TABLET | Refills: 2 | Status: SHIPPED | OUTPATIENT
Start: 2025-03-31

## 2025-03-31 RX ORDER — CYCLOBENZAPRINE HCL 10 MG
10 TABLET ORAL EVERY 8 HOURS PRN
Qty: 30 TABLET | Refills: 1 | Status: SHIPPED | OUTPATIENT
Start: 2025-03-31

## 2025-04-28 RX ORDER — BUPROPION HYDROCHLORIDE 150 MG/1
150 TABLET ORAL EVERY MORNING
Qty: 90 TABLET | Refills: 2 | Status: SHIPPED | OUTPATIENT
Start: 2025-04-28

## 2025-05-05 NOTE — PATIENT INSTRUCTIONS
Patient Education        Learning About the Low FODMAP Diet for Irritable Bowel Syndrome (IBS)  What is the low-FODMAP diet? A low-FODMAP diet is a way to find out what foods give you digestion problems. You stop eating certain high-FODMAP foods for about 2 months. Then you add them back to see how your body reacts. This is called a \"challenge diet. \" A dietitian or doctor can help you follow this diet. FODMAPs are carbohydrates. They are in many types of foods. FODMAP stands for:  · F ermentable. · O ligosaccharides. · D isaccharides. · M onosaccharides. · A nd p olyols. If you have digestive problems, some of these foods can make your symptoms worse. When you are on this diet, you can still eat certain fruits and vegetables. You can also eat certain grains, meats, fish, and lactose-free milks. What is it used for? If you have irritable bowel syndrome (IBS), you can ease your symptoms by not eating some types of foods. Some people also use this diet for inflammatory bowel disease (IBD) or some food intolerances. High-FODMAP foods can be hard to digest. They pull more fluid into your intestines. They are also easily fermented. This can lead to bloating, belly pain, gas, and diarrhea. The low-FODMAP diet can help you figure out what foods to avoid. And it can help you find foods that are easier to digest.  This diet can help with symptoms of some digestive diseases. But it's not a cure. You will still need to manage your condition. How does it work? You will work with a doctor or dietitian when you start the diet. At first, you won't eat any high-FODMAP foods for a few weeks. Go to www.[employer] [place] [employer]. Watchwith to learn more about this diet. Barb Macedo also find links to an jaison for your phone or other device. You'll find low-FODMAP cookbooks there too. After 6 to 8 weeks, you will start to try high-FODMAP foods again. You will add those foods back to your diet, one group at a time.  Your doctor or dietitian will (V5) oriented lard, and margarine (no trans fat). You can have most fresh herbs like basil, chives, coriander, americo, parsley, rosemary and thyme. You can have salt, jams made from low-FODMAP fruits, mayonnaise, and mustard. Soy sauce, hot sauce (no garlic), tamari, and vinegar are also okay. Sweeteners that are okay include sugar (sucrose), powdered (confectioner's) sugar, brown sugar, glucose, and maple syrup. You can also have some artificial sweeteners like aspartame, saccharine, and stevia. Avoid: Chutneys, hummus, jellies, garlic sauces, and gravies made with onion or garlic. Avoid pickles, relish, some salad dressings and soup stocks, salsa, and tomato paste. And avoid sauces and other foods with high fructose corn syrup, honey, molasses, and agave. Avoid artificial sweeteners (isomalt, mannitol, malitol, sorbitol, and xylitol). Avoid corn syrup solids, fructose, fruit juice concentrate, and polydextrose. Other foods and drinks  Okay to have: Water, soda water, tonic, soft drinks sweetened with sugar, ½ cup of low-FODMAP fruit juice, and most teas and alcohols. You can also eat foods made with baking powder and soda, cocoa, and gelatin. Avoid: Juices from high-FODMAP fruits and vegetables. And avoid fortified raúl, chamomile and fennel teas, chicory-based drinks and coffee substitutes, and bouillon cubes. Follow-up care is a key part of your treatment and safety. Be sure to make and go to all appointments, and call your doctor if you are having problems. It's also a good idea to know your test results and keep a list of the medicines you take. Where can you learn more? Go to https://blayne.Yodo1. org and sign in to your [employer]LBR account. Enter L235 in the [employer] box to learn more about \"Learning About the Low FODMAP Diet for Irritable Bowel Syndrome (IBS). \"     If you do not have an account, please click on the \"Sign Up Now\" link.   Current as of: August 22, 2019               Content Version: 12.6  © 2566-2419 [employer]. Care instructions adapted under license by [facility] ([facility]). If you have questions about a medical condition or this instruction, always ask your healthcare professional. [address] any warranty or liability for your use of this information. Patient Education        Irritable Bowel Syndrome: Care Instructions  Your Care Instructions  Irritable bowel syndrome, or IBS, is a problem with the intestines that causes belly pain, bloating, gas, constipation, and diarrhea. The cause of IBS is not well known. IBS can last for many years, but it does not get worse over time or lead to serious disease. Most people can control their symptoms by changing their diet and reducing stress. Follow-up care is a key part of your treatment and safety. Be sure to make and go to all appointments, and call your doctor if you are having problems. It's also a good idea to know your test results and keep a list of the medicines you take. How can you care for yourself at home? · Prevent diarrhea:  ? Limit the amount of high-fiber foods you eat. This includes vegetables, fruits, whole-grain breads and pasta, high-fiber cereal, and brown rice. ? Limit dairy products. ? Limit artificial sweeteners such as sorbitol and xylitol. · Avoid constipation:  ? Include fruits, vegetables, beans, and whole grains in your diet each day. These foods are high in fiber. ? Drink plenty of fluids. If you have kidney, heart, or liver disease and have to limit fluids, talk with your doctor before you increase the amount of fluids you drink. ? Get some exercise every day. Build up slowly to 30 to 60 minutes a day on 5 or more days of the week. ? Take a fiber supplement, such as Citrucel or Metamucil, every day if needed. Read and follow all instructions on the label. ? Schedule time each day for a bowel movement. Having a daily routine may help.  Take your time and do not strain when having a bowel movement. · To help relieve bloating or gas, avoid foods such as beans, cabbage, cauliflower, or broccoli. · Keep a daily diary of what you eat and what symptoms you have. This may help find foods that cause you problems. · Eat slowly. Try to make mealtime relaxing. · Find ways to reduce stress. When should you call for help? Call your doctor now or seek immediate medical care if:    · Your pain is different than usual or occurs with fever.     · You lose weight without trying, or you lose your appetite and you do not know why.     · Your symptoms often wake you from sleep.     · Your stools are black and tarlike or have streaks of blood. Watch closely for changes in your health, and be sure to contact your doctor if:    · Your IBS symptoms get worse or begin to disrupt your day-to-day life.     · You become more tired than usual.     · Your home treatment stops working. Where can you learn more? Go to https://GCI Com.UNX. org and sign in to your Quickflix account. Enter P127 in the Simple Lifeforms box to learn more about \"Irritable Bowel Syndrome: Care Instructions. \"     If you do not have an account, please click on the \"Sign Up Now\" link. Current as of: April 15, 2020               Content Version: 12.6  © 7158-0439 Healthwise, Incorporated. Care instructions adapted under license by [facility] ([facility]). If you have questions about a medical condition or this instruction, always ask your healthcare professional. [address] [address] any warranty or liability for your use of this information. Patient Education        Diet for Irritable Bowel Syndrome: Care Instructions  Your Care Instructions     Irritable bowel syndrome, or IBS, is a problem with the intestines. IBS can cause belly pain, bloating, gas, constipation, and diarrhea. Most people can control their symptoms by changing their diet and easing stress.   No specific foods cause everyone with IBS to have symptoms. Many people find that they feel better by limiting or eliminating foods that may bring on symptoms. Make sure you don't stop eating all foods from any one food group without talking with a dietitian. You need to make sure you are still getting all the nutrients you need. Follow-up care is a key part of your treatment and safety. Be sure to make and go to all appointments, and call your doctor if you are having problems. It's also a good idea to know your test results and keep a list of the medicines you take. How can you care for yourself at home? To reduce constipation  · Include fruits, vegetables, beans, and whole grains in your diet each day. These foods are high in fiber. Slowly increase the amount of fiber you eat. This helps you avoid a lot of gas. · Drink plenty of fluids. If you have kidney, heart, or liver disease and have to limit fluids, talk with your doctor before you increase the amount of fluids you drink. · Get some exercise every day. Build up slowly to 30 to 60 minutes a day on 5 or more days of the week. · Take a fiber supplement, such as Citrucel or Metamucil, every day if needed. Read and follow all instructions on the label. · Schedule time each day for a bowel movement. Having a daily routine may help. Take your time and do not strain when having a bowel movement. · Check with your doctor before you increase the amount of fiber in your diet. For some people who have IBS, eating more fiber may make some symptoms worse. This includes bloating. To reduce diarrhea  You may try giving up foods or drinks one at a time to see whether symptoms improve.  Limit or avoid the following:  · Alcohol  · Caffeine, which is found in coffee, tea, cola drinks, and chocolate  · Nicotine, from smoking or chewing tobacco  · Gas-producing foods, such as beans, broccoli, cabbage, and apples  · Dairy products that contain lactose (milk sugar), such as ice cream and milk.  · Foods and drinks high in sugar, especially fruit juice, soda, candy, and other packaged sweets (such as cookies)  · Foods high in fat, including rojas, sausage, butter, oils, and anything deep-fried  · Sorbitol and xylitol, artificial sweeteners found in some sugarless candies and chewing gum  Keep track of foods  · Some people with IBS use a daily food diary to keep track of what they eat and whether they have any symptoms after eating certain foods. The diary also can be a good way to record what is going on in your life. · Stress plays a role in IBS. So if you are aware that certain stresses bring on symptoms, you can try to reduce those stresses. Keep mealtimes pleasant  · Try to maintain a pleasant environment when you eat. This may reduce stress that can make symptoms likely to occur. · Give yourself plenty of time to eat, rather than eating on the go. Chew your food slowly. Try not to swallow air, which can cause bloating. Where can you learn more? Go to https://Authorea.TravelShark. org and sign in to your Craft Dragon account. Enter X172 in the [facility] box to learn more about \"Diet for Irritable Bowel Syndrome: Care Instructions. \"     If you do not have an account, please click on the \"Sign Up Now\" link. Current as of: August 22, 2019               Content Version: 12.6  © 9440-2473 [employer]Serve, [employer]. Care instructions adapted under license by [facility] ([facility]). If you have questions about a medical condition or this instruction, always ask your healthcare professional. [address] [address] any warranty or liability for your use of this information.

## 2025-05-23 RX ORDER — QUETIAPINE FUMARATE 50 MG/1
50 TABLET, EXTENDED RELEASE ORAL NIGHTLY
Qty: 30 TABLET | Refills: 0 | Status: SHIPPED | OUTPATIENT
Start: 2025-05-23

## 2025-05-27 RX ORDER — BUPROPION HYDROCHLORIDE 150 MG/1
150 TABLET ORAL EVERY MORNING
Qty: 90 TABLET | Refills: 2 | OUTPATIENT
Start: 2025-05-27

## 2025-05-27 RX ORDER — QUETIAPINE FUMARATE 50 MG/1
50 TABLET, EXTENDED RELEASE ORAL NIGHTLY
Qty: 30 TABLET | Refills: 0 | OUTPATIENT
Start: 2025-05-27

## 2025-06-18 ENCOUNTER — HOSPITAL ENCOUNTER (OUTPATIENT)
Dept: CT IMAGING | Age: 50
Discharge: HOME | End: 2025-06-18
Payer: COMMERCIAL

## 2025-06-18 DIAGNOSIS — R10.30 LOWER ABDOMINAL PAIN: ICD-10-CM

## 2025-06-18 PROCEDURE — 6360000004 HC RX CONTRAST MEDICATION: Performed by: NURSE PRACTITIONER

## 2025-06-18 PROCEDURE — 74177 CT ABD & PELVIS W/CONTRAST: CPT

## 2025-06-18 RX ORDER — IOPAMIDOL 755 MG/ML
85 INJECTION, SOLUTION INTRAVASCULAR
Status: COMPLETED | OUTPATIENT
Start: 2025-06-18 | End: 2025-06-18

## 2025-06-18 RX ADMIN — IOPAMIDOL 85 ML: 755 INJECTION, SOLUTION INTRAVENOUS at 13:22

## 2025-06-24 DIAGNOSIS — Z86.69 HISTORY OF TENSION HEADACHE: ICD-10-CM

## 2025-06-24 RX ORDER — QUETIAPINE FUMARATE 50 MG/1
50 TABLET, EXTENDED RELEASE ORAL NIGHTLY
Qty: 30 TABLET | Refills: 0 | Status: SHIPPED | OUTPATIENT
Start: 2025-06-24

## 2025-06-24 RX ORDER — BUTALBITAL, ACETAMINOPHEN AND CAFFEINE 50; 325; 40 MG/1; MG/1; MG/1
TABLET ORAL
Qty: 90 TABLET | Refills: 2 | Status: SHIPPED | OUTPATIENT
Start: 2025-06-24

## 2025-07-01 RX ORDER — MONTELUKAST SODIUM 10 MG/1
10 TABLET ORAL NIGHTLY
Qty: 90 TABLET | Refills: 2 | Status: SHIPPED | OUTPATIENT
Start: 2025-07-01

## 2025-07-25 RX ORDER — QUETIAPINE FUMARATE 50 MG/1
50 TABLET, EXTENDED RELEASE ORAL NIGHTLY
Qty: 30 TABLET | Refills: 0 | Status: SHIPPED | OUTPATIENT
Start: 2025-07-25

## 2025-08-25 RX ORDER — QUETIAPINE FUMARATE 50 MG/1
50 TABLET, EXTENDED RELEASE ORAL NIGHTLY
Qty: 90 TABLET | Refills: 0 | Status: SHIPPED | OUTPATIENT
Start: 2025-08-25

## (undated) DEVICE — GAUZE,SPONGE,8"X4",12PLY,XRAY,STRL,LF: Brand: MEDLINE

## (undated) DEVICE — GOWN,SIRUS,NON REINFRCD,LARGE,SET IN SL: Brand: MEDLINE

## (undated) DEVICE — SURE SET SINGLE BASIN-LF: Brand: MEDLINE INDUSTRIES, INC.

## (undated) DEVICE — SET ENDOSCP SEAL HYSTEROSCOPE RIG OUTFLO CHN DISP MYOSURE

## (undated) DEVICE — GARMENT,MEDLINE,DVT,INT,CALF,MED, GEN2: Brand: MEDLINE

## (undated) DEVICE — TUBING, SUCTION, 1/4" X 12', STRAIGHT: Brand: MEDLINE

## (undated) DEVICE — Z INACTIVE USE 2660664 SOLUTION IRRIG 3000ML 0.9% SOD CHL USP UROMATIC PLAS CONT

## (undated) DEVICE — PREP SOL PVP IODINE 4%  4 OZ/BTL

## (undated) DEVICE — TOWEL,OR,DSP,ST,BLUE,DLX,4/PK,20PK/CS: Brand: MEDLINE

## (undated) DEVICE — PACK,SET UP,NO DRAPES: Brand: MEDLINE

## (undated) DEVICE — SOLUTION SURG PREP POV IOD 7.5% 4 OZ

## (undated) DEVICE — CYSTO/BLADDER IRRIGATION SET, REGULATING CLAMP

## (undated) DEVICE — SET FLD CTRL SYS INFLO AND OUTFLO TB AQUILEX

## (undated) DEVICE — GLOVE SURG SZ 6 THK91MIL LTX FREE SYN POLYISOPRENE ANTI

## (undated) DEVICE — Z DISCONTINUED NO SUB IDED KIT ENDOMET ABLAT IMPED CTRL DEV W/ RF CTRL FTSWCH SUCT LN

## (undated) DEVICE — PAD,SANITARY,11 IN,MAXI,W/WINGS,N-STRL: Brand: MEDLINE

## (undated) DEVICE — SOLUTION IV 1000ML 0.9% SOD CHL PH 5 INJ USP VIAFLX PLAS

## (undated) DEVICE — DEVICE TISS REM DIA3MM L25.25IN ENDOSCP F/ IU POLYPS

## (undated) DEVICE — Z DISCONTINUED BY MEDLINE USE 2711682 TRAY SKIN PREP DRY W/ PREM GLV

## (undated) DEVICE — DRAPE,UNDERBUTTOCKS,PCH,STERILE: Brand: MEDLINE

## (undated) DEVICE — DEVICE TISS REMOVING 17OZ L25.25IN DIA3MM INTUTER CTRL UNIT

## (undated) DEVICE — PAD,NON-ADHERENT,3X8,STERILE,LF,1/PK: Brand: MEDLINE

## (undated) DEVICE — RED RUBBER ROBINSON URETHRAL CATHETER, RADIOPAQUE, SMOOTH ROUNDED TIP, 14 FR (4.7 MM): Brand: DOVER